# Patient Record
Sex: FEMALE | Race: WHITE | NOT HISPANIC OR LATINO | Employment: OTHER | URBAN - METROPOLITAN AREA
[De-identification: names, ages, dates, MRNs, and addresses within clinical notes are randomized per-mention and may not be internally consistent; named-entity substitution may affect disease eponyms.]

---

## 2017-10-08 ENCOUNTER — HOSPITAL ENCOUNTER (INPATIENT)
Facility: OTHER | Age: 62
LOS: 2 days | Discharge: HOME | DRG: 159 | End: 2017-10-11
Attending: EMERGENCY MEDICINE | Admitting: PLASTIC SURGERY
Payer: MEDICARE

## 2017-10-08 DIAGNOSIS — S02.609A: Primary | ICD-10-CM

## 2017-10-08 DIAGNOSIS — S02.609A BILATERAL CLOSED FRACTURE OF MANDIBLE, INITIAL ENCOUNTER: ICD-10-CM

## 2017-10-08 PROCEDURE — 96374 THER/PROPH/DIAG INJ IV PUSH: CPT

## 2017-10-08 PROCEDURE — 99284 EMERGENCY DEPT VISIT MOD MDM: CPT | Mod: 25

## 2017-10-08 PROCEDURE — 96361 HYDRATE IV INFUSION ADD-ON: CPT

## 2017-10-08 PROCEDURE — 0NSV34Z REPOSITION LEFT MANDIBLE WITH INTERNAL FIXATION DEVICE, PERCUTANEOUS APPROACH: ICD-10-PCS | Performed by: PLASTIC SURGERY

## 2017-10-08 PROCEDURE — 0NST34Z REPOSITION RIGHT MANDIBLE WITH INTERNAL FIXATION DEVICE, PERCUTANEOUS APPROACH: ICD-10-PCS | Performed by: PLASTIC SURGERY

## 2017-10-09 ENCOUNTER — ANESTHESIA EVENT (OUTPATIENT)
Dept: SURGERY | Facility: OTHER | Age: 62
DRG: 159 | End: 2017-10-09
Payer: MEDICARE

## 2017-10-09 ENCOUNTER — ANESTHESIA (OUTPATIENT)
Dept: SURGERY | Facility: OTHER | Age: 62
DRG: 159 | End: 2017-10-09
Payer: MEDICARE

## 2017-10-09 PROBLEM — S02.609A: Status: ACTIVE | Noted: 2017-10-09

## 2017-10-09 PROCEDURE — 25000003 PHARM REV CODE 250: Performed by: PLASTIC SURGERY

## 2017-10-09 PROCEDURE — C1713 ANCHOR/SCREW BN/BN,TIS/BN: HCPCS | Performed by: PLASTIC SURGERY

## 2017-10-09 PROCEDURE — 63600175 PHARM REV CODE 636 W HCPCS: Performed by: NURSE ANESTHETIST, CERTIFIED REGISTERED

## 2017-10-09 PROCEDURE — 63600175 PHARM REV CODE 636 W HCPCS: Performed by: PLASTIC SURGERY

## 2017-10-09 PROCEDURE — 37000009 HC ANESTHESIA EA ADD 15 MINS: Performed by: PLASTIC SURGERY

## 2017-10-09 PROCEDURE — 63600175 PHARM REV CODE 636 W HCPCS: Performed by: SURGERY

## 2017-10-09 PROCEDURE — 25000003 PHARM REV CODE 250: Performed by: SURGERY

## 2017-10-09 PROCEDURE — 37000008 HC ANESTHESIA 1ST 15 MINUTES: Performed by: PLASTIC SURGERY

## 2017-10-09 PROCEDURE — 99900035 HC TECH TIME PER 15 MIN (STAT)

## 2017-10-09 PROCEDURE — 27201423 OPTIME MED/SURG SUP & DEVICES STERILE SUPPLY: Performed by: PLASTIC SURGERY

## 2017-10-09 PROCEDURE — 25000003 PHARM REV CODE 250: Performed by: NURSE ANESTHETIST, CERTIFIED REGISTERED

## 2017-10-09 PROCEDURE — 11000001 HC ACUTE MED/SURG PRIVATE ROOM

## 2017-10-09 PROCEDURE — C1769 GUIDE WIRE: HCPCS | Performed by: PLASTIC SURGERY

## 2017-10-09 PROCEDURE — 36000704 HC OR TIME LEV I 1ST 15 MIN: Performed by: PLASTIC SURGERY

## 2017-10-09 PROCEDURE — A4216 STERILE WATER/SALINE, 10 ML: HCPCS | Performed by: SURGERY

## 2017-10-09 PROCEDURE — 36000705 HC OR TIME LEV I EA ADD 15 MIN: Performed by: PLASTIC SURGERY

## 2017-10-09 PROCEDURE — 71000033 HC RECOVERY, INTIAL HOUR: Performed by: PLASTIC SURGERY

## 2017-10-09 DEVICE — PLATE BONE MAX 9 HOLE SMALL 2M: Type: IMPLANTABLE DEVICE | Site: MOUTH | Status: FUNCTIONAL

## 2017-10-09 DEVICE — WIRE GUIDE: Type: IMPLANTABLE DEVICE | Site: MOUTH | Status: FUNCTIONAL

## 2017-10-09 DEVICE — IMPLANTABLE DEVICE: Type: IMPLANTABLE DEVICE | Site: MOUTH | Status: FUNCTIONAL

## 2017-10-09 RX ORDER — CEFAZOLIN SODIUM 2 G/50ML
2 SOLUTION INTRAVENOUS
Status: COMPLETED | OUTPATIENT
Start: 2017-10-09 | End: 2017-10-10

## 2017-10-09 RX ORDER — LIDOCAINE HYDROCHLORIDE AND EPINEPHRINE 10; 10 MG/ML; UG/ML
INJECTION, SOLUTION INFILTRATION; PERINEURAL
Status: DISCONTINUED | OUTPATIENT
Start: 2017-10-09 | End: 2017-10-09 | Stop reason: HOSPADM

## 2017-10-09 RX ORDER — FENTANYL CITRATE 50 UG/ML
INJECTION, SOLUTION INTRAMUSCULAR; INTRAVENOUS
Status: DISCONTINUED | OUTPATIENT
Start: 2017-10-09 | End: 2017-10-09

## 2017-10-09 RX ORDER — MIDAZOLAM HYDROCHLORIDE 1 MG/ML
INJECTION INTRAMUSCULAR; INTRAVENOUS
Status: DISCONTINUED | OUTPATIENT
Start: 2017-10-09 | End: 2017-10-09

## 2017-10-09 RX ORDER — SODIUM CHLORIDE 0.9 % (FLUSH) 0.9 %
3 SYRINGE (ML) INJECTION EVERY 8 HOURS
Status: DISCONTINUED | OUTPATIENT
Start: 2017-10-09 | End: 2017-10-11 | Stop reason: HOSPADM

## 2017-10-09 RX ORDER — ACETAMINOPHEN 10 MG/ML
INJECTION, SOLUTION INTRAVENOUS
Status: DISCONTINUED | OUTPATIENT
Start: 2017-10-09 | End: 2017-10-09

## 2017-10-09 RX ORDER — SODIUM CHLORIDE 0.9 % (FLUSH) 0.9 %
3 SYRINGE (ML) INJECTION
Status: DISCONTINUED | OUTPATIENT
Start: 2017-10-09 | End: 2017-10-11 | Stop reason: HOSPADM

## 2017-10-09 RX ORDER — OXYCODONE HYDROCHLORIDE 5 MG/1
5 TABLET ORAL
Status: DISCONTINUED | OUTPATIENT
Start: 2017-10-09 | End: 2017-10-09

## 2017-10-09 RX ORDER — METOCLOPRAMIDE HYDROCHLORIDE 5 MG/ML
5 INJECTION INTRAMUSCULAR; INTRAVENOUS EVERY 6 HOURS PRN
Status: DISCONTINUED | OUTPATIENT
Start: 2017-10-09 | End: 2017-10-11 | Stop reason: HOSPADM

## 2017-10-09 RX ORDER — DEXAMETHASONE SODIUM PHOSPHATE 4 MG/ML
8 INJECTION, SOLUTION INTRA-ARTICULAR; INTRALESIONAL; INTRAMUSCULAR; INTRAVENOUS; SOFT TISSUE EVERY 12 HOURS
Status: COMPLETED | OUTPATIENT
Start: 2017-10-09 | End: 2017-10-10

## 2017-10-09 RX ORDER — ACETAMINOPHEN 325 MG/1
650 TABLET ORAL EVERY 6 HOURS PRN
Status: DISCONTINUED | OUTPATIENT
Start: 2017-10-09 | End: 2017-10-11 | Stop reason: HOSPADM

## 2017-10-09 RX ORDER — CHLORHEXIDINE GLUCONATE ORAL RINSE 1.2 MG/ML
15 SOLUTION DENTAL 2 TIMES DAILY
Status: DISCONTINUED | OUTPATIENT
Start: 2017-10-09 | End: 2017-10-09

## 2017-10-09 RX ORDER — PROPOFOL 10 MG/ML
VIAL (ML) INTRAVENOUS
Status: DISCONTINUED | OUTPATIENT
Start: 2017-10-09 | End: 2017-10-09

## 2017-10-09 RX ORDER — ONDANSETRON 2 MG/ML
4 INJECTION INTRAMUSCULAR; INTRAVENOUS DAILY PRN
Status: DISCONTINUED | OUTPATIENT
Start: 2017-10-09 | End: 2017-10-09

## 2017-10-09 RX ORDER — MORPHINE SULFATE 10 MG/ML
2 INJECTION INTRAMUSCULAR; INTRAVENOUS; SUBCUTANEOUS EVERY 4 HOURS PRN
Status: DISCONTINUED | OUTPATIENT
Start: 2017-10-09 | End: 2017-10-11 | Stop reason: HOSPADM

## 2017-10-09 RX ORDER — HYDROCODONE BITARTRATE AND ACETAMINOPHEN 7.5; 325 MG/15ML; MG/15ML
15 SOLUTION ORAL EVERY 4 HOURS PRN
Status: DISCONTINUED | OUTPATIENT
Start: 2017-10-09 | End: 2017-10-09

## 2017-10-09 RX ORDER — GLYCOPYRROLATE 0.2 MG/ML
INJECTION INTRAMUSCULAR; INTRAVENOUS
Status: DISCONTINUED | OUTPATIENT
Start: 2017-10-09 | End: 2017-10-09

## 2017-10-09 RX ORDER — ONDANSETRON HYDROCHLORIDE 2 MG/ML
INJECTION, SOLUTION INTRAMUSCULAR; INTRAVENOUS
Status: DISCONTINUED | OUTPATIENT
Start: 2017-10-09 | End: 2017-10-09

## 2017-10-09 RX ORDER — FLUMAZENIL 0.1 MG/ML
INJECTION INTRAVENOUS
Status: DISCONTINUED | OUTPATIENT
Start: 2017-10-09 | End: 2017-10-09

## 2017-10-09 RX ORDER — ROCURONIUM BROMIDE 10 MG/ML
INJECTION, SOLUTION INTRAVENOUS
Status: DISCONTINUED | OUTPATIENT
Start: 2017-10-09 | End: 2017-10-09

## 2017-10-09 RX ORDER — ONDANSETRON 2 MG/ML
4 INJECTION INTRAMUSCULAR; INTRAVENOUS EVERY 12 HOURS PRN
Status: DISCONTINUED | OUTPATIENT
Start: 2017-10-09 | End: 2017-10-11 | Stop reason: HOSPADM

## 2017-10-09 RX ORDER — SODIUM CHLORIDE, SODIUM LACTATE, POTASSIUM CHLORIDE, CALCIUM CHLORIDE 600; 310; 30; 20 MG/100ML; MG/100ML; MG/100ML; MG/100ML
INJECTION, SOLUTION INTRAVENOUS CONTINUOUS
Status: DISCONTINUED | OUTPATIENT
Start: 2017-10-09 | End: 2017-10-09

## 2017-10-09 RX ORDER — MEPERIDINE HYDROCHLORIDE 50 MG/ML
12.5 INJECTION INTRAMUSCULAR; INTRAVENOUS; SUBCUTANEOUS ONCE AS NEEDED
Status: DISCONTINUED | OUTPATIENT
Start: 2017-10-09 | End: 2017-10-09

## 2017-10-09 RX ORDER — NEOSTIGMINE METHYLSULFATE 1 MG/ML
INJECTION, SOLUTION INTRAVENOUS
Status: DISCONTINUED | OUTPATIENT
Start: 2017-10-09 | End: 2017-10-09

## 2017-10-09 RX ORDER — OXYCODONE HYDROCHLORIDE 5 MG/1
5 TABLET ORAL EVERY 4 HOURS PRN
Status: DISCONTINUED | OUTPATIENT
Start: 2017-10-09 | End: 2017-10-09

## 2017-10-09 RX ORDER — SODIUM CHLORIDE, SODIUM LACTATE, POTASSIUM CHLORIDE, CALCIUM CHLORIDE 600; 310; 30; 20 MG/100ML; MG/100ML; MG/100ML; MG/100ML
INJECTION, SOLUTION INTRAVENOUS CONTINUOUS PRN
Status: DISCONTINUED | OUTPATIENT
Start: 2017-10-09 | End: 2017-10-09

## 2017-10-09 RX ORDER — DEXAMETHASONE SODIUM PHOSPHATE 4 MG/ML
INJECTION, SOLUTION INTRA-ARTICULAR; INTRALESIONAL; INTRAMUSCULAR; INTRAVENOUS; SOFT TISSUE
Status: DISCONTINUED | OUTPATIENT
Start: 2017-10-09 | End: 2017-10-09

## 2017-10-09 RX ORDER — HYDROMORPHONE HYDROCHLORIDE 2 MG/ML
0.4 INJECTION, SOLUTION INTRAMUSCULAR; INTRAVENOUS; SUBCUTANEOUS EVERY 5 MIN PRN
Status: DISCONTINUED | OUTPATIENT
Start: 2017-10-09 | End: 2017-10-09

## 2017-10-09 RX ORDER — ACETAMINOPHEN AND CODEINE PHOSPHATE 120; 12 MG/5ML; MG/5ML
5 SOLUTION ORAL EVERY 4 HOURS PRN
Status: DISCONTINUED | OUTPATIENT
Start: 2017-10-09 | End: 2017-10-11 | Stop reason: HOSPADM

## 2017-10-09 RX ORDER — DIPHENHYDRAMINE HYDROCHLORIDE 50 MG/ML
12.5 INJECTION INTRAMUSCULAR; INTRAVENOUS EVERY 30 MIN PRN
Status: DISCONTINUED | OUTPATIENT
Start: 2017-10-09 | End: 2017-10-09

## 2017-10-09 RX ORDER — ONDANSETRON 2 MG/ML
4 INJECTION INTRAMUSCULAR; INTRAVENOUS EVERY 12 HOURS PRN
Status: DISCONTINUED | OUTPATIENT
Start: 2017-10-09 | End: 2017-10-09

## 2017-10-09 RX ORDER — CHLORHEXIDINE GLUCONATE ORAL RINSE 1.2 MG/ML
15 SOLUTION DENTAL 2 TIMES DAILY
Status: DISCONTINUED | OUTPATIENT
Start: 2017-10-09 | End: 2017-10-11 | Stop reason: HOSPADM

## 2017-10-09 RX ORDER — LIDOCAINE HCL/PF 100 MG/5ML
SYRINGE (ML) INTRAVENOUS
Status: DISCONTINUED | OUTPATIENT
Start: 2017-10-09 | End: 2017-10-09

## 2017-10-09 RX ORDER — SODIUM CHLORIDE, SODIUM LACTATE, POTASSIUM CHLORIDE, CALCIUM CHLORIDE 600; 310; 30; 20 MG/100ML; MG/100ML; MG/100ML; MG/100ML
INJECTION, SOLUTION INTRAVENOUS CONTINUOUS
Status: DISCONTINUED | OUTPATIENT
Start: 2017-10-09 | End: 2017-10-11 | Stop reason: HOSPADM

## 2017-10-09 RX ORDER — HEPARIN SODIUM 5000 [USP'U]/ML
5000 INJECTION, SOLUTION INTRAVENOUS; SUBCUTANEOUS EVERY 8 HOURS
Status: DISCONTINUED | OUTPATIENT
Start: 2017-10-09 | End: 2017-10-11 | Stop reason: HOSPADM

## 2017-10-09 RX ORDER — MORPHINE SULFATE 2 MG/ML
2 INJECTION, SOLUTION INTRAMUSCULAR; INTRAVENOUS EVERY 4 HOURS PRN
Status: DISCONTINUED | OUTPATIENT
Start: 2017-10-09 | End: 2017-10-09

## 2017-10-09 RX ORDER — FENTANYL CITRATE 50 UG/ML
25 INJECTION, SOLUTION INTRAMUSCULAR; INTRAVENOUS EVERY 5 MIN PRN
Status: DISCONTINUED | OUTPATIENT
Start: 2017-10-09 | End: 2017-10-09

## 2017-10-09 RX ADMIN — DEXAMETHASONE SODIUM PHOSPHATE 8 MG: 4 INJECTION, SOLUTION INTRAMUSCULAR; INTRAVENOUS at 04:10

## 2017-10-09 RX ADMIN — SODIUM CHLORIDE, SODIUM LACTATE, POTASSIUM CHLORIDE, AND CALCIUM CHLORIDE: 600; 310; 30; 20 INJECTION, SOLUTION INTRAVENOUS at 01:10

## 2017-10-09 RX ADMIN — SODIUM CHLORIDE, SODIUM LACTATE, POTASSIUM CHLORIDE, AND CALCIUM CHLORIDE: 600; 310; 30; 20 INJECTION, SOLUTION INTRAVENOUS at 07:10

## 2017-10-09 RX ADMIN — CHLORHEXIDINE GLUCONATE 15 ML: 1.2 RINSE ORAL at 09:10

## 2017-10-09 RX ADMIN — ROCURONIUM BROMIDE 30 MG: 10 INJECTION, SOLUTION INTRAVENOUS at 03:10

## 2017-10-09 RX ADMIN — AMPICILLIN AND SULBACTAM 1.5 G: 1; .5 INJECTION, POWDER, FOR SOLUTION INTRAVENOUS at 08:10

## 2017-10-09 RX ADMIN — SODIUM CHLORIDE, SODIUM LACTATE, POTASSIUM CHLORIDE, AND CALCIUM CHLORIDE: 600; 310; 30; 20 INJECTION, SOLUTION INTRAVENOUS at 03:10

## 2017-10-09 RX ADMIN — CARBOXYMETHYLCELLULOSE SODIUM 2 DROP: 2.5 SOLUTION/ DROPS OPHTHALMIC at 03:10

## 2017-10-09 RX ADMIN — LIDOCAINE HYDROCHLORIDE 75 MG: 20 INJECTION, SOLUTION INTRAVENOUS at 03:10

## 2017-10-09 RX ADMIN — DEXAMETHASONE SODIUM PHOSPHATE 8 MG: 4 INJECTION, SOLUTION INTRAMUSCULAR; INTRAVENOUS at 09:10

## 2017-10-09 RX ADMIN — NEOSTIGMINE METHYLSULFATE 5 MG: 1 INJECTION INTRAVENOUS at 04:10

## 2017-10-09 RX ADMIN — FENTANYL CITRATE 50 MCG: 50 INJECTION, SOLUTION INTRAMUSCULAR; INTRAVENOUS at 04:10

## 2017-10-09 RX ADMIN — ONDANSETRON 4 MG: 2 INJECTION, SOLUTION INTRAMUSCULAR; INTRAVENOUS at 04:10

## 2017-10-09 RX ADMIN — SODIUM CHLORIDE, SODIUM LACTATE, POTASSIUM CHLORIDE, AND CALCIUM CHLORIDE: 600; 310; 30; 20 INJECTION, SOLUTION INTRAVENOUS at 08:10

## 2017-10-09 RX ADMIN — AMPICILLIN AND SULBACTAM 1.5 G: 1; .5 INJECTION, POWDER, FOR SOLUTION INTRAVENOUS at 01:10

## 2017-10-09 RX ADMIN — MIDAZOLAM HYDROCHLORIDE 1 MG: 1 INJECTION, SOLUTION INTRAMUSCULAR; INTRAVENOUS at 03:10

## 2017-10-09 RX ADMIN — HEPARIN SODIUM 5000 UNITS: 5000 INJECTION, SOLUTION INTRAVENOUS; SUBCUTANEOUS at 09:10

## 2017-10-09 RX ADMIN — GLYCOPYRROLATE 0.8 MG: 0.2 INJECTION, SOLUTION INTRAMUSCULAR; INTRAVENOUS at 04:10

## 2017-10-09 RX ADMIN — FENTANYL CITRATE 50 MCG: 50 INJECTION, SOLUTION INTRAMUSCULAR; INTRAVENOUS at 03:10

## 2017-10-09 RX ADMIN — ACETAMINOPHEN 1000 MG: 10 INJECTION, SOLUTION INTRAVENOUS at 04:10

## 2017-10-09 RX ADMIN — AMPICILLIN AND SULBACTAM 1.5 G: 1; .5 INJECTION, POWDER, FOR SOLUTION INTRAVENOUS at 02:10

## 2017-10-09 RX ADMIN — FLUMAZENIL 0.25 MG: 0.1 INJECTION, SOLUTION INTRAVENOUS at 04:10

## 2017-10-09 RX ADMIN — SODIUM CHLORIDE, PRESERVATIVE FREE 3 ML: 5 INJECTION INTRAVENOUS at 03:10

## 2017-10-09 RX ADMIN — CEFAZOLIN SODIUM 2 G: 2 SOLUTION INTRAVENOUS at 05:10

## 2017-10-09 RX ADMIN — SODIUM CHLORIDE, PRESERVATIVE FREE 3 ML: 5 INJECTION INTRAVENOUS at 09:10

## 2017-10-09 RX ADMIN — PROPOFOL 100 MG: 10 INJECTION, EMULSION INTRAVENOUS at 03:10

## 2017-10-09 NOTE — ANESTHESIA POSTPROCEDURE EVALUATION
"Anesthesia Post Evaluation    Patient: Mariah Hamilton    Procedure(s) Performed: Procedure(s) (LRB):  REDUCTION-CLOSED-MANDIBULAR FRACTURE (Bilateral)    Final Anesthesia Type: general  Patient location during evaluation: PACU  Patient participation: Yes- Able to Participate  Level of consciousness: awake and alert  Post-procedure vital signs: reviewed and stable  Pain management: adequate  Airway patency: patent  PONV status at discharge: No PONV  Anesthetic complications: no      Cardiovascular status: blood pressure returned to baseline and stable  Respiratory status: unassisted, spontaneous ventilation and room air  Hydration status: euvolemic  Follow-up not needed.        Visit Vitals  /71 (BP Location: Left arm, Patient Position: Lying)   Pulse 91   Temp 36.5 °C (97.7 °F) (Oral)   Resp 18   Ht 5' 3" (1.6 m)   Wt 65.1 kg (143 lb 8 oz)   SpO2 98%   Breastfeeding? No   BMI 25.42 kg/m²       Pain/Bijan Score: Pain Assessment Performed: Yes (10/9/2017  5:00 PM)  Presence of Pain: denies (10/9/2017  5:00 PM)  Bijan Score: 8 (10/9/2017  5:00 PM)  Modified Bijan Score: 18 (10/9/2017  5:00 PM)      "

## 2017-10-09 NOTE — PLAN OF CARE
Discharge Planning:    Patient will be discharged to her daughter Sahara's home:  [address] [place] [id number]      Amanda Bello RN,CM  Phone # [phone]  Fax # [phone]

## 2017-10-09 NOTE — TRANSFER OF CARE
"Anesthesia Transfer of Care Note    Patient: Mariah Hamilton    Procedure(s) Performed: Procedure(s) (LRB):  REDUCTION-CLOSED-MANDIBULAR FRACTURE (Bilateral)    Patient location: PACU    Anesthesia Type: general    Transport from OR: Transported from OR on room air with adequate spontaneous ventilation    Post pain: adequate analgesia    Post assessment: no apparent anesthetic complications and tolerated procedure well    Post vital signs: stable    Level of consciousness: awake, alert and oriented    Nausea/Vomiting: no nausea/vomiting    Complications: none    Transfer of care protocol was followed      Last vitals:   Visit Vitals  /63 (Patient Position: Lying)   Pulse 83   Temp 37.4 °C (99.4 °F) (Oral)   Resp 18   Ht 5' 3" (1.6 m)   Wt 65.1 kg (143 lb 8 oz)   SpO2 (!) 93%   Breastfeeding? No   BMI 25.42 kg/m²     "

## 2017-10-09 NOTE — ANESTHESIA PREPROCEDURE EVALUATION
10/09/2017  Mariah Hamilton is a 62 y.o., female.    Anesthesia Evaluation    I have reviewed the Patient Summary Reports.    I have reviewed the Nursing Notes.   I have reviewed the Medications.     Review of Systems  Anesthesia Hx:  Denies Family Hx of Anesthesia complications.   Denies Personal Hx of Anesthesia complications.   Social:  Former Smoker    Hematology/Oncology:  Hematology Normal       -- Cancer in past history (skin):    EENT/Dental:EENT/Dental Normal   Cardiovascular:  Cardiovascular Normal  Syncope at home, fell and broke mandible  W/u neg head CT, no cardiac hx or sx's.    Pulmonary:  Pulmonary Normal    Renal/:   Chronic Renal Disease, CRI    Hepatic/GI:  Hepatic/GI Normal    Musculoskeletal:  Musculoskeletal Normal    Neurological:  Neurology Normal    Endocrine:  Endocrine Normal    Dermatological:  Skin Normal    Psych:  Psychiatric Normal           Physical Exam  General:  Well nourished    Airway/Jaw/Neck:  Airway Findings: Mouth Opening: < 3 cm General Airway Assessment: Possible difficult intubation, Possible difficult mask airway  Mallampati: IV  TM Distance: < 4 cm  Jaw/Neck Findings:  Neck ROM: Normal ROM      Dental:  Dental Findings: In tact         Mental Status:  Mental Status Findings:  Cooperative, Alert and Oriented         Anesthesia Plan  Type of Anesthesia, risks & benefits discussed:  Anesthesia Type:  general  Patient's Preference:   Intra-op Monitoring Plan: standard ASA monitors  Intra-op Monitoring Plan Comments:   Post Op Pain Control Plan:   Post Op Pain Control Plan Comments:   Induction:    Beta Blocker:         Informed Consent: Patient understands risks and agrees with Anesthesia plan.  Questions answered. Anesthesia consent signed with patient.  ASA Score: 3     Day of Surgery Review of History & Physical:    H&P update referred to the surgeon.          Ready For Surgery From Anesthesia Perspective.

## 2017-10-09 NOTE — PLAN OF CARE
Problem: Patient Care Overview  Goal: Plan of Care Review  Outcome: Ongoing (interventions implemented as appropriate)  Reviewed plan of care with pt at bedside. IVFs and abx admin per md order. Pt denies pain on pm shift. VSS. Pt remains NPO for surgery today. Pre-op checklist initiated; sx consent signed in pt folder. Hourly rounding performed. All safety precautions in place, bed alarm on, side rails up x2, call light within reach/ Instructed pt to call for assistance. Pt remains free from falls/injury. Will continue to monitor.

## 2017-10-09 NOTE — ED PROVIDER NOTES
Encounter Date: 10/8/2017       History     Chief Complaint   Patient presents with    Transfer     transfer from [facility] (accepted by [facility]) for further evaluation of OMFS; report pt had syncopal episode earlier today, falling forward striking her face     Patient is a 62 y.o. female presenting to the emergency department as a transfer for evaluation of a mandibular fracture.  The patient is accompanied by her daughter.  The patient's daughter states that earlier this afternoon at approximately 3 PM she was walking in her kitchen when she had a syncopal episode, causing her to fall forward on her face landing on a the carpet.  She admits that she was taken to [facility] where they told her she broke her jaw, needed further evaluation.  The patient currently denies pain at this time.       The history is provided by the patient and a relative.     Review of patient's allergies indicates:  Allergies not on file  Past Medical History:   Diagnosis Date    Basal cell carcinoma     to face      Past Surgical History:   Procedure Laterality Date    BASAL CELL CARCINOMA EXCISION Right     cervicofacial flap Right      SECTION       History reviewed. No pertinent family history.  Social History   Substance Use Topics    Smoking status: Never Smoker    Smokeless tobacco: Never Used    Alcohol use No     Review of Systems   Constitutional: Negative for activity change, appetite change, chills, fatigue and fever.   HENT: Positive for facial swelling. Negative for congestion, rhinorrhea and sore throat.    Eyes: Negative for photophobia and visual disturbance.   Respiratory: Negative for cough, shortness of breath and wheezing.    Cardiovascular: Negative for chest pain.   Gastrointestinal: Negative for abdominal pain, diarrhea, nausea and vomiting.   Genitourinary: Negative for dysuria, hematuria and urgency.   Musculoskeletal: Negative for back pain, myalgias and neck pain.    Skin: Negative for color change and wound.   Neurological: Positive for syncope. Negative for weakness and headaches.   Psychiatric/Behavioral: Negative for agitation and confusion.       Physical Exam     Initial Vitals [10/08/17 2233]   BP Pulse Resp Temp SpO2   (!) 146/67 78 18 98.1 °F (36.7 °C) 96 %      MAP       93.33         Physical Exam    Nursing note and vitals reviewed.  Constitutional: She appears well-developed and well-nourished. She is not diaphoretic. She is cooperative.  Non-toxic appearance. She does not have a sickly appearance. She does not appear ill. No distress.    female presenting to the emergency department via EMS transfer accompanied by her daughter.  She speaking clear and full sentences.  She denies acute distress.   HENT:   Head: Normocephalic and atraumatic.   Right Ear: External ear normal.   Left Ear: External ear normal.   Nose: Nose normal.   Mouth/Throat: Oropharynx is clear and moist.   Tenderness to palpation of the mandible bilaterally , decreased range of motion.  1 horizontal laceration noted to the chin, 6 sutures in place. 1 smaller laceration noted to the lower submandibular region with 3 sutures in place. Tenderness of multiple teeth, especially at tooth #24 with subluxation noted.   Eyes: Conjunctivae and EOM are normal.   Neck: Normal range of motion. Neck supple.   Cardiovascular: Normal rate, regular rhythm and normal heart sounds.   Pulmonary/Chest: Breath sounds normal. No respiratory distress. She has no wheezes.   Musculoskeletal: Normal range of motion.   Neurological: She is alert and oriented to person, place, and time. GCS eye subscore is 4. GCS verbal subscore is 5. GCS motor subscore is 6.   Skin: Skin is warm.   Psychiatric: She has a normal mood and affect. Her behavior is normal. Judgment and thought content normal.         ED Course   Procedures  Labs Reviewed - No data to display          Medical Decision Making:   History:   Old Medical  Records: I decided to obtain old medical records.  Old Records Summarized: records from another hospital.       <> Summary of Records: Reviewed medical records from [facility].  CBC, CMP, magnesium, PT, PTT are all unremarkable.    CT head without contrast shows no acute intracranial hemorrhage; moderate diffuse cerebral and cerebellar atrophy when accounting for age with mild.  Ventricular deep cerebral white matter findings suggestive of chronic microscopic ischemic disease.  CT cervical spine shows no acute fracture or malalignment.    CT facial bones show extensively comminuted fracture involving the neck and body of both mandibular condyles.  There are multiple segmented fracture fragment, left greater than the right and foreshortening of the jaw.  Subcutaneous soft tissue emphysema and swelling under the mandible noted.  Chest x-ray shows no acute cardiac or pulmonary process.  EKG shows normal sinus rhythm with a rate of 89 bpm, no STEMI.  While in the emergency department, the patient received T., 900 mg of clindamycin, Zofran, 2 mg of morphine, fluids, also had laceration repair.  Initial Assessment:   Urgent evaluation of a 62 y.o. female presenting to the emergency department as a transfer for a mandibular fracture. Patient is afebrile, nontoxic appearing and hemodynamically stable. Physical exam reveals regular rate and rhythm, lungs are clear to auscultation bilaterally. Tenderness to palpation of the mandible, repaired lacerations noted. Dental subluxations present. reviewed medical record as noted above. Will plan to page RUPERTO.   ED Management:  10:33 PM Called and left Dr. Thapa ([facility]) message that the patient has arrived in ED.  11:32 PM Called and left second message for Dr. Thapa.   11:44 PM Paged [facility] Plastic surgery resident.   11:46 PM Dr. Gambino will come see and evaluate the patient.   Dr. Gambino has seen and evaluated the patient. Will admit the patient for  surgery in the am. The treatment plan was discussed with the patient who demonstrated understanding and comfort with plan. The patient's history, physical exam, and plan of care was discussed with and agreed upon with my supervising physician.    Other:   I have discussed this case with another health care provider.       <> Summary of the Discussion: Dr. Lorenzo, Dr. Gambino   This note was created using Dragon Medical Dictation. There may be typographical errors secondary to dictation.               Attending Attestation:     Physician Attestation Statement for NP/PA:   I have conducted a face to face encounter with this patient in addition to the NP/PA, due to Medical Complexity    Other NP/PA Attestation Additions:      Medical Decision Making:     Transferred for OMFS repair of B/L mandible fx after syncope and trauma. Syncope workup there (-), needs OR repair of fractures. Admitted to FS                 ED Course      Clinical Impression:     1. Bilateral closed fracture of mandible    2. Bilateral closed fracture of mandible, initial encounter       Disposition:   Disposition: Admitted  Condition: Stable                        Bettie Bain PA-C  10/09/17 0121       Hank Lorenzo MD  10/09/17 0802

## 2017-10-09 NOTE — PLAN OF CARE
10/09/17 1317   Discharge Assessment   Assessment Type Discharge Planning Assessment   Confirmed/corrected address and phone number on facesheet? Yes   Assessment information obtained from? Patient;Caregiver;Medical Record   Expected Length of Stay (days) 3   Communicated expected length of stay with patient/caregiver yes   Prior to hospitilization cognitive status: Alert/Oriented   Prior to hospitalization functional status: Independent   Current cognitive status: Alert/Oriented   Current Functional Status: Independent   Facility Arrived From: [facility]   Lives With alone   Able to Return to Prior Arrangements no   Is patient able to care for self after discharge? Unable to determine at this time (comments)   Who are your caregiver(s) and their phone number(s)? Daughter will bring patient to her  house. Sahara Louie  # [phone]   Patient's perception of discharge disposition admitted as an inpatient   Readmission Within The Last 30 Days no previous admission in last 30 days   Patient currently being followed by outpatient case management? No   Patient currently receives any other outside agency services? No   Equipment Currently Used at Home none   Do you have any problems affording any of your prescribed medications? No   Is the patient taking medications as prescribed? yes   Does the patient have transportation home? Yes   Transportation Available car;family or friend will provide   Does the patient receive services at the [facility]? No   Discharge Plan A Home with family   Patient/Family In Agreement With Plan yes

## 2017-10-09 NOTE — ED NOTES
Patient rec'ed Tetanus shot, Clindamycin 900 mg IVPB, Morphine 2mg, and Zofran 4 mg IVP prior to transfer.

## 2017-10-09 NOTE — ED NOTES
"Pt transferred from [facility], accepted by Dr. Jay. Pt reporting she had syncopal episode earlier today while at home with postive LOC. Pt stating, "I ran to the couch trying to catch myself because I began to fell very dizzy, I had an episode like this earlier this year" but pt did not have LOC during episode. Pt with 6 stitches in place to chin below mouth and 3 stiches in place lower chin/neck region. Pt reporting pain is 8/10, reporting sore throat. Provider is aware of pt current pain level. Pt refusing pain medicine at this time. Will continue to monitor. Pt AAOx4 and appropriate at this time. Respirations even and unlabored. No acute distress noted.   "

## 2017-10-09 NOTE — H&P
[facility]  History & Physical  Plastic Surgery    SUBJECTIVE:     Chief Complaint/Reason for Admission: mandible fracture    History of Present Illness:  Patient is a 62 y.o. female transferred from [facility] with bilateral mandible fracture.  Patient was in her usual state of health when she sustained a syncopal episode and fell forward on her face.  Syncopal workup was negative but CT Max/Face demonstrated bilateral subcondylar fractures.  Patient reports a sore throat and believes she was coming down with a cold.  She reports minimal left jaw pain.  Denies fevers/chills, CP/SOB, nausea/vomiting.    Review of patient's allergies indicates:  No Known Allergies    Past Medical History:   Diagnosis Date    Basal cell carcinoma     to face      Past Surgical History:   Procedure Laterality Date    BASAL CELL CARCINOMA EXCISION Right     cervicofacial flap Right      SECTION       History reviewed. No pertinent family history.  Social History   Substance Use Topics    Smoking status: Never Smoker    Smokeless tobacco: Never Used    Alcohol use No        Review of Systems:  Constitutional: no fever or chills, pain well controlled  Eyes: no visual changes  ENT: positive for sore throat  Respiratory: no cough or shortness of breath  Cardiovascular: no chest pain or palpitations  Gastrointestinal: no nausea or vomiting, tolerating diet  Neurological: no seizures or tremors  Behavioral/Psych: no auditory or visual hallucinations    OBJECTIVE:     Vital Signs (Most Recent):  Temp: 98.1 °F (36.7 °C) (10/08/17 2233)  Pulse: 80 (10/09/17 0023)  Resp: 18 (10/08/17 2233)  BP: 124/60 (10/09/17 0023)  SpO2: (!) 94 % (10/09/17 0023)    Physical Exam:  General: well developed, well nourished, appears stated age, no distress  Head: normocephalic, PERRL, EOMI. +TTP over left TMJ.  no intra-oral lacerations.  +repaired lacerations to chin and neck.  CN2-12 intact  Eyes:  conjunctivae/corneas clear.,  negative findings: lids and lashes normal and pupils equal, round, reactive to light and accomodation  Nose: Nares normal. Septum midline. Mucosa normal. No drainage or sinus tenderness., no discharge, no epistaxis  Neck: supple, symmetrical, trachea midline  Lungs:  clear to auscultation bilaterally and normal respiratory effort  Heart: regular rate and rhythm, S1, S2 normal, no murmur, rub or gallop  Abdomen: soft, non-tender non-distented; bowel sounds normal; no masses,  no organomegaly    Diagnostic Results:  CT: Reviewed  bilateral comminuted subcondylar fractures    ASSESSMENT/PLAN:     63yo F with bilateral subcondylar fractures  Admit to Plastic Surgery, NPO, IV antibiotics (unasyn)  -decadron to decrease swelling  -peridex  -to OR later today for closed reduction, mmf, possible open reduction

## 2017-10-09 NOTE — ED NOTES
Pt AAOx4 with VSS and respirations even and unlabored at this time. Fall risk band applied. Pt c/o sore throat while sitting up on stretcher,but denies wanting pain medication at this time. Restroom and comfort needs addressed. Pt advised that she is NPO and given mouth swabs for comfort. Pt denies any other needs at this time. Side rails raised x2 and call light within reach. Daughter present at bedside with patient at this time. Will continue to monitor for any other changes.

## 2017-10-10 PROCEDURE — 97162 PT EVAL MOD COMPLEX 30 MIN: CPT

## 2017-10-10 PROCEDURE — 63600175 PHARM REV CODE 636 W HCPCS: Performed by: PLASTIC SURGERY

## 2017-10-10 PROCEDURE — 97116 GAIT TRAINING THERAPY: CPT

## 2017-10-10 PROCEDURE — A4216 STERILE WATER/SALINE, 10 ML: HCPCS | Performed by: SURGERY

## 2017-10-10 PROCEDURE — 99222 1ST HOSP IP/OBS MODERATE 55: CPT | Mod: ,,, | Performed by: HOSPITALIST

## 2017-10-10 PROCEDURE — 25000003 PHARM REV CODE 250: Performed by: PLASTIC SURGERY

## 2017-10-10 PROCEDURE — 63600175 PHARM REV CODE 636 W HCPCS: Performed by: SURGERY

## 2017-10-10 PROCEDURE — 94761 N-INVAS EAR/PLS OXIMETRY MLT: CPT

## 2017-10-10 PROCEDURE — 25000003 PHARM REV CODE 250: Performed by: SURGERY

## 2017-10-10 PROCEDURE — 11000001 HC ACUTE MED/SURG PRIVATE ROOM

## 2017-10-10 PROCEDURE — 99900035 HC TECH TIME PER 15 MIN (STAT)

## 2017-10-10 RX ADMIN — SODIUM CHLORIDE, SODIUM LACTATE, POTASSIUM CHLORIDE, AND CALCIUM CHLORIDE: 600; 310; 30; 20 INJECTION, SOLUTION INTRAVENOUS at 03:10

## 2017-10-10 RX ADMIN — CHLORHEXIDINE GLUCONATE 15 ML: 1.2 RINSE ORAL at 08:10

## 2017-10-10 RX ADMIN — CHLORHEXIDINE GLUCONATE 15 ML: 1.2 RINSE ORAL at 09:10

## 2017-10-10 RX ADMIN — DEXAMETHASONE SODIUM PHOSPHATE 8 MG: 4 INJECTION, SOLUTION INTRAMUSCULAR; INTRAVENOUS at 10:10

## 2017-10-10 RX ADMIN — CEFAZOLIN SODIUM 2 G: 2 SOLUTION INTRAVENOUS at 01:10

## 2017-10-10 RX ADMIN — HEPARIN SODIUM 5000 UNITS: 5000 INJECTION, SOLUTION INTRAVENOUS; SUBCUTANEOUS at 01:10

## 2017-10-10 RX ADMIN — HEPARIN SODIUM 5000 UNITS: 5000 INJECTION, SOLUTION INTRAVENOUS; SUBCUTANEOUS at 09:10

## 2017-10-10 RX ADMIN — HEPARIN SODIUM 5000 UNITS: 5000 INJECTION, SOLUTION INTRAVENOUS; SUBCUTANEOUS at 05:10

## 2017-10-10 RX ADMIN — SODIUM CHLORIDE, SODIUM LACTATE, POTASSIUM CHLORIDE, AND CALCIUM CHLORIDE: 600; 310; 30; 20 INJECTION, SOLUTION INTRAVENOUS at 01:10

## 2017-10-10 RX ADMIN — SODIUM CHLORIDE, PRESERVATIVE FREE 3 ML: 5 INJECTION INTRAVENOUS at 05:10

## 2017-10-10 NOTE — OP NOTE
DATE OF PROCEDURE:  10/09/2017    PREOPERATIVE DIAGNOSIS:  Bilateral subcondylar fracture.    POSTOPERATIVE DIAGNOSIS:  Bilateral subcondylar fracture.    PROCEDURES:  Closed reduction of bilateral subcondylar fracture with hybrid MMF.    SURGEON:  Luis M Thapa M.D.    ASSISTANT:  Piter.    ANESTHESIA:  General through nasal intubations.    ESTIMATED BLOOD LOSS:  Minimal.    COMPLICATION:  None.    DISPOSITION:  The patient tolerated procedure well, emerged from general   anesthesia, was extubated in the Operating Room without complication.    DESCRIPTION OF OPERATIVE PROCEDURE:  The patient was taken to the Operating   Room, placed supine on the operating table.  Following induction of general   anesthesia and successful oral intubation, the patient was prepped and draped in   a sterile fashion.  Attention was then directed to the oral cavity, which was   suctioned dry and a hybrid MMF was adapted to the maxilla and mandible that were   secured in place using multiple 8 mm screws.  The patient was then placed in   maxillomandibular fixation using rubber bands.  The patient emerged from general   anesthesia, was extubated in the Operating Room without complication.      HS/HN  dd: 10/09/2017 16:46:00 (CDT)  td: 10/09/2017 21:12:05 (CDT)  Doc ID   #[id number]  Job ID #[id number]    CC:

## 2017-10-10 NOTE — PLAN OF CARE
Problem: Physical Therapy Goal  Goal: Physical Therapy Goal  Goals to be met by: 11/10/2017     Patient will increase functional independence with mobility by performin. Gait x 150 feet independently.  2. Ascend/descend curb step independently  3. Tandem stance eyes open R forward > 10 seconds   4. Tandem stance eyes open L forward > 10 seconds  5. PT to discuss and assess potential use of rollator with pt.    Outcome: Ongoing (interventions implemented as appropriate)  PT evaluation complete. Pt is CGA with OOB activity due to symptoms consistent with orthostatic hypotension.  Sitting: Heart rate: 75 bpm; Blood pressure: 132/74 mmHg  Standing: Heart rate: 101 bpm; Blood pressure: 124/64 mmHg  Pt did not complain of light headedness during sit>stand or during gait. Gait distance was approximately 300 feet. Will continue to follow and progress as tolerated. Please see progress note for detailed plan of care and recommendations.

## 2017-10-10 NOTE — PROGRESS NOTES
63 yo F s/p fall with bilateral subcondylar fractures and lower lip laceration, s/p Closed reduction of mandibular fractures and MMF. Due to possible syncopal episode.0    AVSS - HD Stable  Doing well, no acute complaints. Currently on MMF.   Laceration repairs c/d/i.    A/P:   - Continue CLD  - OOB w assistance.  - follow PT  - DVT ppx   - Pain control  - Awaiting Hospitalist consult recommendations for possible syncopal work up as inpatient vs outpatient upon discharge.  - DC planing pending consult.

## 2017-10-10 NOTE — PT/OT/SLP EVAL
Physical Therapy  Evaluation and Treatment    Mariah Hamilton   MRN: [id number]   Admitting Diagnosis: Bilateral closed fracture of mandible Pre-op Diagnosis: Bilateral closed fracture of mandible, initial encounter [S02.609A] s/p Procedure(s): REDUCTION-CLOSED-MANDIBULAR FRACTURE (ADD ON)     PT Received On: 10/10/17  PT Start Time: 0900     PT Stop Time: 0942    PT Total Time (min): 42 min       Billable Minutes:  Evaluation 25 and Gait [id number]    Diagnosis: Bilateral closed fracture of mandible s/p syncopal episode Pre-op Diagnosis: Bilateral closed fracture of mandible, initial encounter [S02.609A] s/p Procedure(s): REDUCTION-CLOSED-MANDIBULAR FRACTURE (ADD ON)     Past Medical History:   Diagnosis Date    Basal cell carcinoma     to face       Past Surgical History:   Procedure Laterality Date    BASAL CELL CARCINOMA EXCISION Right     cervicofacial flap Right      SECTION       Referring physician: Luis M Thapa MD  Date referred to PT: 10/9/2017    General Precautions: Standard,  (Fall risk. Orthostatic)  Orthopedic Precautions: N/A   Braces: N/A       Do you have any cultural, spiritual, Denominational conflicts, given your current situation?: none    Patient History:  Lives With: alone (Reports she will d/c to daughter's house)  Living Arrangements: house (Daughter's house)  Home Accessibility: stairs to enter home  Number of Stairs to Enter Home: 1  Stair Railings at Home: none  Transportation Available: car, family or friend will provide  Living Environment Comment: Pt lives alone in second story apartment. She reports she will d/c to daughter's 1 story house with 1 LAURA. Pt will have tub shower with shower bench. She reports she is independent with all ADL's and does her own cooking and cleaning. She currently drives. She does not work.  Equipment Currently Used at Home: shower chair  DME owned (not currently used): none    Previous Level of Function:  Ambulation Skills: independent  Transfer  "Skills: independent  ADL Skills: independent  Work/Leisure Activity: independent    Subjective:  Communicated with nurse prior to session.  "I was trying to get back to the couch because I knew I was going down" (re: recent fall at home)    Chief Complaint: Trouble getting off floor  Patient goals: Would like to return to prior living environment    Pain/Comfort  Pain Rating 1: 0/10  Pain Rating Post-Intervention 1: 0/10      Objective:   Patient found with: SCD, peripheral IV sitting edge of bed. Nurse present initially.     Sitting: Heart rate: 75 bpm; Blood pressure: 132/74 mmHg  Standing: Heart rate: 101 bpm; Blood pressure: 124/64 mmHg  Sitting after gait: Heart rate: 94 bpm; Blood pressure: 133/69 mmHg     Cognitive Exam:  Oriented to: Person, Place, Time and Situation    Follows Commands/attention: Follows two-step commands  Communication: clear/fluent able to make wants and needs known  Safety awareness/insight to disability: impaired (does not see benefit of continued PT to decrease fall risk despite explanation)    Physical Exam:  Postural examination/scapula alignment: Rounded shoulder and Head forward    Skin integrity: Visible skin intact with minor bruising noted on anterior jaw and wound on chin  Edema: None noted    Sensation:   Intact to light touch on BLE    Lower Extremity Range of Motion:  Right Lower Extremity: WFL  Left Lower Extremity: WFL    Lower Extremity Strength with gross MMT:  Right Lower Extremity: WFL  Left Lower Extremity: WFL    No coordination or tone impairments identified with below mobility; no formal testing performed.     Functional Mobility:  Bed Mobility:  Scooting/Bridging: Supervision (increased time)  Sit to Supine: Modified Independent (increased time)    Transfers:  Sit <> Stand Assistance: Supervision (x3 trials from edge of bed. Pt did not report symptoms consistent with orthostatic hypotension but blood pressure decreased from 132/74 to 124/64 with pulse increasing " "from 75 bpm to 101 bpm)  Sit <> Stand Assistive Device: No Assistive Device    Gait:   Gait Distance: Pt walked approximately 300 ft with CGA and no AD. Pt demonstrated decreased armswing bilaterally and was very externally rotated at hip with "toe out" posture. Pt demonstrated increased horizontal sway and general unsteadiness with horizontal and vertical head turns. Pt unable to perform tandom gait without LOB which she was able to self-correct with stepping strategy with CGA.  Assistance 1: Contact Guard Assistance  Gait Assistive Device: No device  Gait Pattern: reciprocal  Gait Deviation(s): decreased step length, decreased toe-to-floor clearance, decreased weight-shifting ability    Balance:   Static Sit: GOOD: Takes MODERATE challenges from all directions  Dynamic Sit: GOOD: Maintains balance through MODERATE excursions of active trunk movement  Static Stand: GOOD: Takes MODERATE challenges from all directions  Dynamic stand: FAIR+: Needs CLOSE SUPERVISION during gait and is able to right self with minor LOB     Education:  Discussed safety with pt such as transitioning slowly from supine>sit>stand, being aware of any light headedness or dizziness,  as well as not staying in bed for long periods of time.    AM-PAC 6 CLICK MOBILITY  How much help from another person does this patient currently need?   1 = Unable, Total/Dependent Assistance  2 = A lot, Maximum/Moderate Assistance  3 = A little, Minimum/Contact Guard/Supervision  4 = None, Modified [place]/Independent    Turning over in bed (including adjusting bedclothes, sheets and blankets)?: 4  Sitting down on and standing up from a chair with arms (e.g., wheelchair, bedside commode, etc.): 3  Moving from lying on back to sitting on the side of the bed?: 4  Moving to and from a bed to a chair (including a wheelchair)?: 3  Need to walk in hospital room?: 3  Climbing 3-5 steps with a railing?: 3  Total Score: 20     AM-PAC Raw Score CMS G-Code Modifier " Level of Impairment Assistance   6 % Total / Unable   7 - 9 CM 80 - 100% Maximal Assist   10 - 14 CL 60 - 80% Moderate Assist   15 - 19 CK 40 - 60% Moderate Assist   20 - 22 CJ 20 - 40% Minimal Assist   23 CI 1-20% SBA / CGA   24 CH 0% Independent/ Mod I     Patient left supine per her preference (Declined up in chair) with call button in reach.    Assessment:   Mariah Hamilton is a 62 y.o. female with a medical diagnosis of Bilateral closed fracture of mandible s/p syncopal episode Pre-op Diagnosis: Bilateral closed fracture of mandible, initial encounter [S02.609A] s/p Procedure(s): REDUCTION-CLOSED-MANDIBULAR FRACTURE  PT evaluation complete. She is CGA with OOB activities due to drop in blood pressure from 132/74 mmHg to 124/64 mmHg with pulse rate rising from 75 to 101 bpm after sit>stand. Pt could benefit from PT to improve balance and to be further educated on compensatory methods when living with symptoms consistent with orthostatic hypotension. PT recommends outpatient PT upon d/c pending daughter's availability to provide transportation or pt being medically cleared to continue driving. Home health may be alternative if safe transportation not available.    Rehab identified problem list/impairments: Rehab identified problem list/impairments: weakness, gait instability, impaired balance, impaired functional mobilty, decreased lower extremity function    Rehab potential is good.    Activity tolerance: Good    Discharge recommendations: Discharge Facility/Level Of Care Needs: outpatient PT (Pending daughter availability to drive pt to appointments or pt being medically cleared to drive. Home health possible alternative. Pt may not be agreeable to continuing PT after d/c )     Barriers to discharge: Barriers to Discharge: None    Equipment recommendations: Equipment Needed After Discharge:  (Pt may benefit from rollator. Will discuss at next session if pt is agreeable)     GOALS:    Physical Therapy Goals         Problem: Physical Therapy Goal    Goal Priority Disciplines Outcome Goal Variances Interventions   Physical Therapy Goal     PT/OT, PT Ongoing (interventions implemented as appropriate)     Description:  Goals to be met by: 11/10/2017     Patient will increase functional independence with mobility by performin. Gait x 150 feet independently.  2. Ascend/descend curb step independently  3. Tandem stance eyes open R forward > 10 seconds   4. Tandem stance eyes open L forward > 10 seconds  5. PT to discuss and assess potential use of rollator with pt.                      PLAN:    Patient to be seen 3 x/week to address the above listed problems via gait training, neuromuscular re-education  Plan of Care expires: 11/10/17  Plan of Care reviewed with: patient          Van Carter, SPT  10/10/2017     I certify that I was present in the room directing the student in service delivery and guiding them using my skilled judgment. As the co-signing therapist I have reviewed the students documentation and am responsible for the treatment, assessment, and plan.     Miryam Nj, PT 10/10/2017

## 2017-10-11 VITALS
TEMPERATURE: 98 F | HEIGHT: 63 IN | BODY MASS INDEX: 25.43 KG/M2 | HEART RATE: 80 BPM | WEIGHT: 143.5 LBS | SYSTOLIC BLOOD PRESSURE: 132 MMHG | OXYGEN SATURATION: 95 % | RESPIRATION RATE: 16 BRPM | DIASTOLIC BLOOD PRESSURE: 70 MMHG

## 2017-10-11 PROCEDURE — 99232 SBSQ HOSP IP/OBS MODERATE 35: CPT | Mod: ,,, | Performed by: HOSPITALIST

## 2017-10-11 PROCEDURE — 63600175 PHARM REV CODE 636 W HCPCS: Performed by: PLASTIC SURGERY

## 2017-10-11 PROCEDURE — 94761 N-INVAS EAR/PLS OXIMETRY MLT: CPT

## 2017-10-11 PROCEDURE — 25000003 PHARM REV CODE 250: Performed by: PLASTIC SURGERY

## 2017-10-11 PROCEDURE — 97116 GAIT TRAINING THERAPY: CPT

## 2017-10-11 PROCEDURE — 99900035 HC TECH TIME PER 15 MIN (STAT)

## 2017-10-11 RX ORDER — CHLORHEXIDINE GLUCONATE ORAL RINSE 1.2 MG/ML
15 SOLUTION DENTAL
Qty: 473 ML | Refills: 0 | Status: SHIPPED | OUTPATIENT
Start: 2017-10-11 | End: 2017-10-25

## 2017-10-11 RX ORDER — OXYCODONE HCL 5 MG/5 ML
5 SOLUTION, ORAL ORAL EVERY 4 HOURS PRN
Qty: 200 ML | Refills: 0 | Status: SHIPPED | OUTPATIENT
Start: 2017-10-11 | End: 2017-12-04 | Stop reason: CLARIF

## 2017-10-11 RX ORDER — HYDROCODONE BITARTRATE AND ACETAMINOPHEN 5; 325 MG/1; MG/1
1 TABLET ORAL EVERY 4 HOURS PRN
Status: DISCONTINUED | OUTPATIENT
Start: 2017-10-11 | End: 2017-10-11 | Stop reason: HOSPADM

## 2017-10-11 RX ORDER — SODIUM CHLORIDE 9 MG/ML
INJECTION, SOLUTION INTRAVENOUS CONTINUOUS
Status: DISCONTINUED | OUTPATIENT
Start: 2017-10-11 | End: 2017-10-11 | Stop reason: HOSPADM

## 2017-10-11 RX ADMIN — CHLORHEXIDINE GLUCONATE 15 ML: 1.2 RINSE ORAL at 08:10

## 2017-10-11 RX ADMIN — SODIUM CHLORIDE, SODIUM LACTATE, POTASSIUM CHLORIDE, AND CALCIUM CHLORIDE: 600; 310; 30; 20 INJECTION, SOLUTION INTRAVENOUS at 05:10

## 2017-10-11 RX ADMIN — HEPARIN SODIUM 5000 UNITS: 5000 INJECTION, SOLUTION INTRAVENOUS; SUBCUTANEOUS at 05:10

## 2017-10-11 NOTE — PLAN OF CARE
Problem: Physical Therapy Goal  Goal: Physical Therapy Goal  Goals to be met by: 11/10/2017     Patient will increase functional independence with mobility by performin. Gait x 150 feet independently.  2. Ascend/descend curb step independently  3. Tandem stance eyes open R forward > 10 seconds   4. Tandem stance eyes open L forward > 10 seconds  5. Gait trial with rollator.   6. Ascend/descend curb step with or without AD.      Outcome: Revised  PT goals revised as above.

## 2017-10-11 NOTE — HOSPITAL COURSE
The patient has an uneventful post-op course.  She is not orthostatic with BP measurements.  The patient should follow up with her PCP within a few days for continued evaluation of syncopal episode that caused the fall.  No arrhythmias noted on telemetry monitoring.

## 2017-10-11 NOTE — CONSULTS
[facility]  Hospital Medicine  Consult Note    Patient Name: Mariah Hamilton  MRN: [id number]  Admission Date: 10/8/2017  Hospital Length of Stay: 2 days  Attending Physician: Luis M Thapa MD   Primary Care Provider: Primary Doctor No           Patient information was obtained from patient and past medical records.     Consults  Subjective:     Principal Problem: Bilateral closed fracture of mandible    Chief Complaint:   Chief Complaint   Patient presents with    Transfer     transfer from [facility] (accepted by [facility]) for further evaluation of OMFS; report pt had syncopal episode earlier today, falling forward striking her face        HPI: This is a 62 year old female with a history of basal cell carcinoma s/p excision, who presents with a bilateral mandible fracture after a fall.  The patient is s/p a closed reduction of the bilateral subcondylar fracture.  Hospital Medicine is consulted for medical management.    Past Medical History:   Diagnosis Date    Basal cell carcinoma     to face        Past Surgical History:   Procedure Laterality Date    BASAL CELL CARCINOMA EXCISION Right     cervicofacial flap Right      SECTION         Review of patient's allergies indicates:  No Known Allergies    No current facility-administered medications on file prior to encounter.      No current outpatient prescriptions on file prior to encounter.     Family History     Problem Relation (Age of Onset)    Asthma Father    Basal cell carcinoma Brother    Cancer Brother    Depression Mother    No Known Problems Daughter        Social History Main Topics    Smoking status: Former Smoker     Packs/day: 0.25     Years: 30.00     Start date: 1979     Quit date: 2009    Smokeless tobacco: Never Used    Alcohol use No    Drug use: No    Sexual activity: No     Review of Systems   Constitutional: Negative for activity change and fever.   HENT: Negative for congestion and  trouble swallowing.    Respiratory: Negative for shortness of breath.    Cardiovascular: Negative for chest pain.   Gastrointestinal: Negative for abdominal pain.   Genitourinary: Negative for difficulty urinating.   Musculoskeletal: Negative for arthralgias and neck pain.   Neurological: Negative for headaches.   Psychiatric/Behavioral: The patient is not nervous/anxious.      Objective:     Vital Signs (Most Recent):  Temp: 97.5 °F (36.4 °C) (10/11/17 0412)  Pulse: 75 (10/11/17 0412)  Resp: 18 (10/11/17 0412)  BP: (!) 157/74 (10/11/17 0412)  SpO2: (!) 94 % (10/11/17 0412) Vital Signs (24h Range):  Temp:  [96.4 °F (35.8 °C)-98.2 °F (36.8 °C)] 97.5 °F (36.4 °C)  Pulse:  [] 75  Resp:  [14-18] 18  SpO2:  [94 %-97 %] 94 %  BP: (120-157)/(56-74) 157/74     Weight: 65.1 kg (143 lb 8 oz)  Body mass index is 25.42 kg/m².    Physical Exam   Constitutional: She is oriented to person, place, and time. She appears well-developed and well-nourished.   Pleasant female in no distress with facial sutures intact   Eyes: Conjunctivae are normal. Pupils are equal, round, and reactive to light.   Neck: Neck supple. No thyromegaly present.   Cardiovascular: Normal rate, regular rhythm, normal heart sounds and intact distal pulses.  Exam reveals no gallop and no friction rub.    No murmur heard.  Pulmonary/Chest: Effort normal and breath sounds normal. No respiratory distress. She has no wheezes. She has no rales.   Abdominal: Soft. Bowel sounds are normal. She exhibits no distension and no mass. There is no tenderness. There is no rebound and no guarding.   Musculoskeletal: Normal range of motion.   Lymphadenopathy:     She has no cervical adenopathy.   Neurological: She is alert and oriented to person, place, and time. She has normal reflexes. She displays normal reflexes. No cranial nerve deficit. She exhibits normal muscle tone. Coordination normal.   Skin: Skin is warm and dry. No rash noted. No erythema. No pallor.    Positive facial abrasions are clean   Psychiatric: She has a normal mood and affect. Her behavior is normal. Judgment and thought content normal.       Significant Labs: All pertinent labs within the past 24 hours have been reviewed.    Significant Imaging: I have reviewed and interpreted all pertinent imaging results/findings within the past 24 hours.    Assessment/Plan:     * Bilateral closed fracture of mandible    The patient is hemodynamically stable  DVT prophylaxis employed  Adequate pain control             VTE Risk Mitigation         Ordered     heparin (porcine) injection 5,000 Units  Every 8 hours     Route:  Subcutaneous        10/09/17 1819     Place sequential compression device  Until discontinued      10/09/17 1819     Place JONATHAN hose  Until discontinued      10/09/17 1819     Medium Risk of VTE  Once      10/09/17 1819     Place sequential compression device  Until discontinued      10/09/17 1819     Place JONATHAN hose  Until discontinued      10/09/17 0043     Place sequential compression device  Until discontinued      10/09/17 0043              Thank you for your consult. I will follow-up with patient. Please contact us if you have any additional questions.    Lucero Barber MD  Department of Hospital Medicine   [facility]

## 2017-10-11 NOTE — SUBJECTIVE & OBJECTIVE
Interval History: The patient has no complaints this am.    Review of Systems   Constitutional: Negative for activity change and fever.   HENT: Negative for congestion and trouble swallowing.    Respiratory: Negative for shortness of breath.    Cardiovascular: Negative for chest pain.   Gastrointestinal: Negative for abdominal pain.   Genitourinary: Negative for difficulty urinating.   Musculoskeletal: Negative for arthralgias and neck pain.   Neurological: Negative for headaches.   Psychiatric/Behavioral: The patient is not nervous/anxious.      Objective:     Vital Signs (Most Recent):  Temp: 97.9 °F (36.6 °C) (10/11/17 0800)  Pulse: 84 (10/11/17 0800)  Resp: 18 (10/11/17 0800)  BP: (!) 165/74 (10/11/17 0800)  SpO2: (!) 94 % (10/11/17 0800) Vital Signs (24h Range):  Temp:  [97.2 °F (36.2 °C)-98.2 °F (36.8 °C)] 97.9 °F (36.6 °C)  Pulse:  [58-84] 84  Resp:  [14-18] 18  SpO2:  [94 %-97 %] 94 %  BP: (120-165)/(56-74) 165/74     Weight: 65.1 kg (143 lb 8 oz)  Body mass index is 25.42 kg/m².    Intake/Output Summary (Last 24 hours) at 10/11/17 1037  Last data filed at 10/11/17 0600   Gross per 24 hour   Intake              240 ml   Output                0 ml   Net              240 ml      Physical Exam   Constitutional: She is oriented to person, place, and time. She appears well-developed and well-nourished.   Pleasant female in no distress with facial sutures intact   Eyes: Conjunctivae are normal. Pupils are equal, round, and reactive to light.   Neck: Neck supple. No thyromegaly present.   Cardiovascular: Normal rate, regular rhythm, normal heart sounds and intact distal pulses.  Exam reveals no gallop and no friction rub.    No murmur heard.  Pulmonary/Chest: Effort normal and breath sounds normal. No respiratory distress. She has no wheezes. She has no rales.   Abdominal: Soft. Bowel sounds are normal. She exhibits no distension and no mass. There is no tenderness. There is no rebound and no guarding.    Musculoskeletal: Normal range of motion.   Lymphadenopathy:     She has no cervical adenopathy.   Neurological: She is alert and oriented to person, place, and time. She has normal reflexes. She displays normal reflexes. No cranial nerve deficit. She exhibits normal muscle tone. Coordination normal.   Skin: Skin is warm and dry. No rash noted. No erythema. No pallor.   Positive facial abrasions are clean   Psychiatric: She has a normal mood and affect. Her behavior is normal. Judgment and thought content normal.     Significant Imaging: I have reviewed and interpreted all pertinent imaging results/findings within the past 24 hours.

## 2017-10-11 NOTE — HPI
This is a 62 year old female with a history of basal cell carcinoma s/p excision, who presents with a bilateral mandible fracture after a fall.  The patient is s/p a closed reduction of the bilateral subcondylar fracture.  Hospital Medicine is consulted for medical management.

## 2017-10-11 NOTE — PLAN OF CARE
Problem: Physical Therapy Goal  Goal: Physical Therapy Goal  Goals to be met by: 11/10/2017     Patient will increase functional independence with mobility by performin. Gait x 150 feet independently.  2. Ascend/descend curb step independently  3. Tandem stance eyes open R forward > 10 seconds   4. Tandem stance eyes open L forward > 10 seconds  5. PT to discuss and assess potential use of rollator with pt.     Outcome: Ongoing (interventions implemented as appropriate)    Slowly progressing towards goals, unsteady with gait training

## 2017-10-11 NOTE — DISCHARGE SUMMARY
Plastic Surgery Discharge Summary    Admission date: 10/8/2017 10:28 PM  Discharge date: No discharge date for patient encounter.    Admission Dx: Bilateral subcondylar fx s/p fall  Discharge Dx: same    Attending: [facility] MD: Vijay    Hospital Course:  Patient was admitted for bilateral subcondylar fx and lip laceration suffered during fall. She underwent closed reduction and MMF with hybrid arch bars with elastics. She did well throughout her hospital stay. We consulted medicine to determine if she needed inpatient syncope work-up for which they said that she was stable for outpatient follow-up. She was discharged home to RTC in 1-2 weeks.    Procedure: Closed reduction of bilateral subcondylar fx with MMF    PE:  Pre-morbid occlusion intact    Discharge instructions:  Do not drive or operate heavy machinery when taking narcotic pain medicine.  Soft diet (anything that can be taken through straw)  Resume home activities  Can brush and rinse mouth after meals and at bedtime  Can replace rubber bands if they snap  Call Dr Bhakta's office to f/u in 1-2 weeks.    Tobin Linares  [employer] Plastic Surgery PGY4  Pager [phone]

## 2017-10-11 NOTE — PROGRESS NOTES
[facility] Hospital Medicine  Progress Note    Patient Name: Mariah Hamilton  MRN: [id number]  Patient Class: IP- Inpatient   Admission Date: 10/8/2017  Length of Stay: 2 days  Attending Physician: Luis M Thapa MD  Primary Care Provider: Primary Doctor No        Subjective:     Principal Problem:Bilateral closed fracture of mandible    HPI:  This is a 62 year old female with a history of basal cell carcinoma s/p excision, who presents with a bilateral mandible fracture after a fall.  The patient is s/p a closed reduction of the bilateral subcondylar fracture.  Hospital Medicine is consulted for medical management.    Hospital Course:  The patient has an uneventful post-op course.  She is not orthostatic with BP measurements.  The patient should follow up with her PCP within a few days for continued evaluation of syncopal episode that caused the fall.  No arrhythmias noted on telemetry monitoring.    Interval History: The patient has no complaints this am.    Review of Systems   Constitutional: Negative for activity change and fever.   HENT: Negative for congestion and trouble swallowing.    Respiratory: Negative for shortness of breath.    Cardiovascular: Negative for chest pain.   Gastrointestinal: Negative for abdominal pain.   Genitourinary: Negative for difficulty urinating.   Musculoskeletal: Negative for arthralgias and neck pain.   Neurological: Negative for headaches.   Psychiatric/Behavioral: The patient is not nervous/anxious.      Objective:     Vital Signs (Most Recent):  Temp: 97.9 °F (36.6 °C) (10/11/17 0800)  Pulse: 84 (10/11/17 0800)  Resp: 18 (10/11/17 0800)  BP: (!) 165/74 (10/11/17 0800)  SpO2: (!) 94 % (10/11/17 0800) Vital Signs (24h Range):  Temp:  [97.2 °F (36.2 °C)-98.2 °F (36.8 °C)] 97.9 °F (36.6 °C)  Pulse:  [58-84] 84  Resp:  [14-18] 18  SpO2:  [94 %-97 %] 94 %  BP: (120-165)/(56-74) 165/74     Weight: 65.1 kg (143 lb 8 oz)  Body mass index is 25.42  kg/m².    Intake/Output Summary (Last 24 hours) at 10/11/17 1037  Last data filed at 10/11/17 0600   Gross per 24 hour   Intake              240 ml   Output                0 ml   Net              240 ml      Physical Exam   Constitutional: She is oriented to person, place, and time. She appears well-developed and well-nourished.   Pleasant female in no distress with facial sutures intact   Eyes: Conjunctivae are normal. Pupils are equal, round, and reactive to light.   Neck: Neck supple. No thyromegaly present.   Cardiovascular: Normal rate, regular rhythm, normal heart sounds and intact distal pulses.  Exam reveals no gallop and no friction rub.    No murmur heard.  Pulmonary/Chest: Effort normal and breath sounds normal. No respiratory distress. She has no wheezes. She has no rales.   Abdominal: Soft. Bowel sounds are normal. She exhibits no distension and no mass. There is no tenderness. There is no rebound and no guarding.   Musculoskeletal: Normal range of motion.   Lymphadenopathy:     She has no cervical adenopathy.   Neurological: She is alert and oriented to person, place, and time. She has normal reflexes. She displays normal reflexes. No cranial nerve deficit. She exhibits normal muscle tone. Coordination normal.   Skin: Skin is warm and dry. No rash noted. No erythema. No pallor.   Positive facial abrasions are clean   Psychiatric: She has a normal mood and affect. Her behavior is normal. Judgment and thought content normal.     Significant Imaging: I have reviewed and interpreted all pertinent imaging results/findings within the past 24 hours.    Assessment/Plan:      * Bilateral closed fracture of mandible    The patient is hemodynamically stable  No further inpatient evaluation for possible syncope  Patient to follow up with PCP within 3-4 days  Patient medically optimized for discharge            VTE Risk Mitigation         Ordered     heparin (porcine) injection 5,000 Units  Every 8 hours     Route:   Subcutaneous        10/09/17 1819     Place sequential compression device  Until discontinued      10/09/17 1819     Place JONATHAN hose  Until discontinued      10/09/17 1819     Medium Risk of VTE  Once      10/09/17 1819     Place sequential compression device  Until discontinued      10/09/17 1819     Place JONATHAN hose  Until discontinued      10/09/17 0043     Place sequential compression device  Until discontinued      10/09/17 0043              Lucero Barber MD  Department of Hospital Medicine   [facility]

## 2017-10-11 NOTE — PLAN OF CARE
Problem: Patient Care Overview  Goal: Plan of Care Review  Outcome: Ongoing (interventions implemented as appropriate)  Pt on RA;SPO2 95% with no distress. No changes in therapy.Will continue to monitor.

## 2017-10-11 NOTE — PLAN OF CARE
10/11/17 1724   Final Note   Assessment Type Final Discharge Note   Discharge Disposition Home   What phone number can be called within the next 1-3 days to see how you are doing after discharge? ([phone])   Hospital Follow Up  Appt(s) scheduled? No   Discharge plans and expectations educations in teach back method with documentation complete? Yes   Right Care Referral Info   Post Acute Recommendation No Care

## 2017-10-11 NOTE — DISCHARGE INSTRUCTIONS
Do not drive or operate heavy machinery when taking narcotic pain medicine.  Soft diet (anything that can be taken through straw)  Resume home activities  Can brush and rinse mouth after meals and at bedtime  Can replace rubber bands if they snap  Call Dr Bhakta's office to f/u in 1-2 weeks.  Please follow-up with PCP regarding syncope work-up in 1-3 days.

## 2017-10-11 NOTE — ASSESSMENT & PLAN NOTE
The patient is hemodynamically stable  No further inpatient evaluation for possible syncope  Patient to follow up with PCP within 3-4 days  Patient medically optimized for discharge

## 2017-10-11 NOTE — PT/OT/SLP PROGRESS
"Physical Therapy  Treatment    Mariah Hamilton   MRN: [id number]   Admitting Diagnosis: Bilateral closed fracture of mandible    PT Received On: 10/11/17  PT Start Time: 1000     PT Stop Time: 1023    PT Total Time (min): 23 min       Billable Minutes:  Gait 23    Treatment Type: Treatment  PT/PTA: PTA     PTA Visit Number: 1       General Precautions: Standard,  (fall risk, orthostatic)  Orthopedic Precautions: N/A   Braces: N/A    Do you have any cultural, spiritual, Yazdanism conflicts, given your current situation?: none    Subjective:  Communicated with nurse prior to session. Patient stated " I feel ok"     Pain/Comfort  Pain Rating 1: 0/10  Pain Rating Post-Intervention 1: 0/10    Objective:   Patient found with: peripheral IV, SCD supine in bed     Functional Mobility:  Bed Mobility:   Scooting/Bridging: Modified Independent  Supine to Sit: Modified Independent    Transfers: sit to stand from bed   Sit <> Stand Assistance: Supervision  Sit <> Stand Assistive Device: No Assistive Device    Gait:   Gait Distance:  (200 feet w/o AD required CGA with unsteady gait. patient then ambulated 150 feet with rollator with close SBA. Balance improved. )  Assistance 1: Contact Guard Assistance  Gait Assistive Device: No device  Gait Pattern: reciprocal  Gait Deviation(s): decreased mohsen, decreased step length, decreased weight-shifting ability    Stairs:  Ascend/descend full flight of stairs with L HR with CGA     Therapeutic Activities and Exercises:  Educated patient on the importance of using AD for stability with gait. Educated patient on rollator so she could sit when feeling dizzy     AM-PAC 6 CLICK MOBILITY  How much help from another person does this patient currently need?   1 = Unable, Total/Dependent Assistance  2 = A lot, Maximum/Moderate Assistance  3 = A little, Minimum/Contact Guard/Supervision  4 = None, Modified [place]/Independent    Turning over in bed (including adjusting bedclothes, sheets and " blankets)?: 4  Sitting down on and standing up from a chair with arms (e.g., wheelchair, bedside commode, etc.): 3  Moving from lying on back to sitting on the side of the bed?: 3  Moving to and from a bed to a chair (including a wheelchair)?: 3  Need to walk in hospital room?: 3  Climbing 3-5 steps with a railing?: 3  Total Score: 19    AM-PAC Raw Score CMS G-Code Modifier Level of Impairment Assistance   6 % Total / Unable   7 - 9 CM 80 - 100% Maximal Assist   10 - 14 CL 60 - 80% Moderate Assist   15 - 19 CK 40 - 60% Moderate Assist   20 - 22 CJ 20 - 40% Minimal Assist   23 CI 1-20% SBA / CGA   24 CH 0% Independent/ Mod I     Patient left up in chair with all lines intact, call button in reach and nurse notified.    Assessment:  Mariah Hamilton is a 62 y.o. female with a medical diagnosis of Bilateral closed fracture of mandible  Patient tolerated treatment session fairly well and would benefit from using AD for stability. Patient is refusing to be discharged with AD. Patient would benefit from OP PT to work on balance and functional mobility.     Rehab identified problem list/impairments: Rehab identified problem list/impairments: weakness, impaired endurance, impaired functional mobilty, gait instability, decreased ROM, impaired skin    Rehab potential is good.    Activity tolerance: Good    Discharge recommendations: Discharge Facility/Level Of Care Needs: outpatient PT     Barriers to discharge: Barriers to Discharge: None    Equipment recommendations: Equipment Needed After Discharge:  (patient is refusing to use AD )     GOALS:    Physical Therapy Goals        Problem: Physical Therapy Goal    Goal Priority Disciplines Outcome Goal Variances Interventions   Physical Therapy Goal     PT/OT, PT Ongoing (interventions implemented as appropriate)     Description:  Goals to be met by: 11/10/2017     Patient will increase functional independence with mobility by performin. Gait x 150 feet  independently.  2. Ascend/descend curb step independently  3. Tandem stance eyes open R forward > 10 seconds   4. Tandem stance eyes open L forward > 10 seconds  5. PT to discuss and assess potential use of rollator with pt.                      PLAN:    Patient to be seen 3 x/week  to address the above listed problems via gait training, neuromuscular re-education  Plan of Care expires: 11/10/17  Plan of Care reviewed with: patient         Jackie MCCAIN Patrick, PTA  10/11/2017

## 2017-10-11 NOTE — SUBJECTIVE & OBJECTIVE
Past Medical History:   Diagnosis Date    Basal cell carcinoma     to face        Past Surgical History:   Procedure Laterality Date    BASAL CELL CARCINOMA EXCISION Right     cervicofacial flap Right      SECTION         Review of patient's allergies indicates:  No Known Allergies    No current facility-administered medications on file prior to encounter.      No current outpatient prescriptions on file prior to encounter.     Family History     Problem Relation (Age of Onset)    Asthma Father    Basal cell carcinoma Brother    Cancer Brother    Depression Mother    No Known Problems Daughter        Social History Main Topics    Smoking status: Former Smoker     Packs/day: 0.25     Years: 30.00     Start date: 1979     Quit date: 2009    Smokeless tobacco: Never Used    Alcohol use No    Drug use: No    Sexual activity: No     Review of Systems   Constitutional: Negative for activity change and fever.   HENT: Negative for congestion and trouble swallowing.    Respiratory: Negative for shortness of breath.    Cardiovascular: Negative for chest pain.   Gastrointestinal: Negative for abdominal pain.   Genitourinary: Negative for difficulty urinating.   Musculoskeletal: Negative for arthralgias and neck pain.   Neurological: Negative for headaches.   Psychiatric/Behavioral: The patient is not nervous/anxious.      Objective:     Vital Signs (Most Recent):  Temp: 97.5 °F (36.4 °C) (10/11/17 0412)  Pulse: 75 (10/11/17 0412)  Resp: 18 (10/11/17 0412)  BP: (!) 157/74 (10/11/17 0412)  SpO2: (!) 94 % (10/11/17 0412) Vital Signs (24h Range):  Temp:  [96.4 °F (35.8 °C)-98.2 °F (36.8 °C)] 97.5 °F (36.4 °C)  Pulse:  [] 75  Resp:  [14-18] 18  SpO2:  [94 %-97 %] 94 %  BP: (120-157)/(56-74) 157/74     Weight: 65.1 kg (143 lb 8 oz)  Body mass index is 25.42 kg/m².    Physical Exam   Constitutional: She is oriented to person, place, and time. She appears well-developed and well-nourished.   Pleasant  female in no distress with facial sutures intact   Eyes: Conjunctivae are normal. Pupils are equal, round, and reactive to light.   Neck: Neck supple. No thyromegaly present.   Cardiovascular: Normal rate, regular rhythm, normal heart sounds and intact distal pulses.  Exam reveals no gallop and no friction rub.    No murmur heard.  Pulmonary/Chest: Effort normal and breath sounds normal. No respiratory distress. She has no wheezes. She has no rales.   Abdominal: Soft. Bowel sounds are normal. She exhibits no distension and no mass. There is no tenderness. There is no rebound and no guarding.   Musculoskeletal: Normal range of motion.   Lymphadenopathy:     She has no cervical adenopathy.   Neurological: She is alert and oriented to person, place, and time. She has normal reflexes. She displays normal reflexes. No cranial nerve deficit. She exhibits normal muscle tone. Coordination normal.   Skin: Skin is warm and dry. No rash noted. No erythema. No pallor.   Positive facial abrasions are clean   Psychiatric: She has a normal mood and affect. Her behavior is normal. Judgment and thought content normal.       Significant Labs: All pertinent labs within the past 24 hours have been reviewed.    Significant Imaging: I have reviewed and interpreted all pertinent imaging results/findings within the past 24 hours.

## 2017-10-13 NOTE — PROGRESS NOTES
Physical Therapy Discharge Summary    Mariah Hamilton  MRN: [id number]   Bilateral closed fracture of mandible   Patient Discharged from acute Physical Therapy on 10/11/17.  Please refer to prior PT noted date on 10/11/17 for functional status.     Assessment:   Goals partially met.  GOALS:    Physical Therapy Goals     Not on file          Multidisciplinary Problems (Resolved)        Problem: Physical Therapy Goal    Goal Priority Disciplines Outcome Goal Variances Interventions   Physical Therapy Goal   (Resolved)     PT/OT, PT Outcome(s) achieved     Description:  Goals to be met by: 11/10/2017     Patient will increase functional independence with mobility by performin. Gait x 150 feet independently.  2. Ascend/descend curb step independently  3. Tandem stance eyes open R forward > 10 seconds   4. Tandem stance eyes open L forward > 10 seconds  5. Gait trial with rollator.   6. Ascend/descend curb step with or without AD.                      Reasons for Discontinuation of Therapy Services  Transfer to alternate level of care.      Plan:  Patient Discharged to: home with outpatient PT recommended.

## 2017-12-04 ENCOUNTER — ANESTHESIA EVENT (OUTPATIENT)
Dept: SURGERY | Facility: OTHER | Age: 62
End: 2017-12-04
Payer: MEDICARE

## 2017-12-04 ENCOUNTER — HOSPITAL ENCOUNTER (OUTPATIENT)
Dept: PREADMISSION TESTING | Facility: OTHER | Age: 62
Discharge: HOME | End: 2017-12-04
Attending: PLASTIC SURGERY
Payer: MEDICARE

## 2017-12-04 VITALS
BODY MASS INDEX: 24.8 KG/M2 | TEMPERATURE: 99 F | OXYGEN SATURATION: 97 % | HEIGHT: 63 IN | DIASTOLIC BLOOD PRESSURE: 67 MMHG | HEART RATE: 66 BPM | WEIGHT: 140 LBS | SYSTOLIC BLOOD PRESSURE: 127 MMHG

## 2017-12-04 RX ORDER — SODIUM CHLORIDE, SODIUM LACTATE, POTASSIUM CHLORIDE, CALCIUM CHLORIDE 600; 310; 30; 20 MG/100ML; MG/100ML; MG/100ML; MG/100ML
INJECTION, SOLUTION INTRAVENOUS CONTINUOUS
Status: CANCELLED | OUTPATIENT
Start: 2017-12-04

## 2017-12-04 RX ORDER — FAMOTIDINE 20 MG/1
20 TABLET, FILM COATED ORAL
Status: CANCELLED | OUTPATIENT
Start: 2017-12-04 | End: 2017-12-04

## 2017-12-04 NOTE — ANESTHESIA PREPROCEDURE EVALUATION
12/04/2017  Mariah Hamilton is a 62 y.o., female.    Anesthesia Evaluation    I have reviewed the Patient Summary Reports.    I have reviewed the Nursing Notes.   I have reviewed the Medications.     Review of Systems  Anesthesia Hx:  History of prior surgery of interest to airway management or planning: jaw. Previous anesthesia: General October 2017, mandibular fracture, [place] easy with general anesthesia.  Procedure performed at an [facility]. Airway issues documented on chart review include mask, easy  Denies Family Hx of Anesthesia complications.   Denies Personal Hx of Anesthesia complications.   Social:  Former Smoker    Hematology/Oncology:  Hematology Normal       -- Cancer in past history (skin):    EENT/Dental:EENT/Dental Normal   Cardiovascular:  Cardiovascular Normal  Syncope at home in October 2017, fell and broke mandible  W/u neg head CT, no cardiac hx or sx's.   Sent to [facility] for syncopal w/u per Dr. Thapa. Labs unremarkable except for probable DM, CKD, hypercalcemia   Pulmonary:  Pulmonary Normal    Renal/:   Chronic Renal Disease, CRI    Hepatic/GI:  Hepatic/GI Normal    Musculoskeletal:  Musculoskeletal Normal    Neurological:  Neurology Normal    Endocrine:  Endocrine Normal    Dermatological:  Skin Normal    Psych:  Psychiatric Normal           Physical Exam  General:  Well nourished    Airway/Jaw/Neck:  Airway Findings: Mouth Opening: Small, but > 3cm General Airway Assessment: Possible difficult intubation, Possible difficult mask airway  Mallampati: III  Improves to II with phonation.  TM Distance: < 4 cm  Jaw/Neck Findings:  Neck ROM: Normal ROM      Dental:  Dental Findings: Prominent Incisors         Mental Status:  Mental Status Findings:  Cooperative, Alert and Oriented         Anesthesia Plan  Type of Anesthesia, risks & benefits discussed:  Anesthesia Type:   general  Patient's Preference:   Intra-op Monitoring Plan: standard ASA monitors  Intra-op Monitoring Plan Comments:   Post Op Pain Control Plan:   Post Op Pain Control Plan Comments:   Induction:   IV  Beta Blocker:         Informed Consent: Patient understands risks and agrees with Anesthesia plan.  Questions answered. Anesthesia consent signed with patient.  ASA Score: 3     Day of Surgery Review of History & Physical:    H&P update referred to the surgeon.     Anesthesia Plan Notes: Sent to [facility] for syncopal w/u per Dr. Thapa. Labs unremarkable except for probable DM, CKD, hypercalcemia    Labs done on October 26, 2017 (viewed on pt's daughter's phone).  To be faxed.  Glucose 135, GFR 51        Ready For Surgery From Anesthesia Perspective.

## 2017-12-04 NOTE — DISCHARGE INSTRUCTIONS
PRE-ADMIT TESTING -  [phone]    [address]  [place] [facility]          Your surgery has been scheduled at [facility]. We are pleased to have the opportunity to serve you. For Further Information please call [phone].    On the day of surgery please report to the Information Desk on the 1st floor.    · CONTACT YOUR PHYSICIAN'S OFFICE THE DAY PRIOR TO YOUR SURGERY TO OBTAIN YOUR ARRIVAL TIME.     · The evening before surgery do not eat anything after 9 p.m. ( this includes hard candy, chewing gum and mints).  You may only have GATORADE, POWERADE AND WATER  from 9 p.m. until you leave your home.   DO NOT DRINK ANY LIQUIDS ON THE WAY TO THE HOSPITAL.      SPECIAL MEDICATION INSTRUCTIONS: TAKE medications checked off by the Anesthesiologist on your Medication List.    Angiogram Patients: Take medications as instructed by your physician, including aspirin.     Surgery Patients:    If you take ASPIRIN - Your PHYSICIAN/SURGEON will need to inform you IF/OR when you need to stop taking aspirin prior to your surgery.     Do Not take any medications containing IBUPROFEN.  Do Not Wear any make-up or dark nail polish   (especially eye make-up) to surgery. If you come to surgery with makeup on you will be required to remove the makeup or nail polish.    Do not shave your surgical area at least 5 days prior to your surgery. The surgical prep will be performed at the hospital according to Infection Control regulations.    Leave all valuables at home.   Do Not wear any jewelry or watches, including any metal in body piercings.  Contact Lens must be removed before surgery. Either do not wear the contact lens or bring a case and solution for storage.  Please bring a container for eyeglasses or dentures as required.  Bring any paperwork your physician has provided, such as consent forms,  history and physicals, doctor's orders, etc.   Bring comfortable clothes that are loose fitting to wear upon  discharge. Take into consideration the type of surgery being performed.  Maintain your diet as advised per your physician the day prior to surgery.      Adequate rest the night before surgery is advised.   Park in the Parking lot behind the hospital or in the [facility] across the street from the parking lot. Parking is complimentary.  If you will be discharged the same day as your procedure, please arrange for a responsible adult to drive you home or to accompany you if traveling by taxi.   YOU WILL NOT BE PERMITTED TO DRIVE OR TO LEAVE THE HOSPITAL ALONE AFTER SURGERY.   It is strongly recommended that you arrange for someone to remain with you for the first 24 hrs following your surgery.       Thank you for your cooperation.  The Staff of [facility].        Bathing Instructions                                                                 Please shower the evening before and morning of your procedure with    ANTIBACTERIAL SOAP. ( DIAL, etc )  Concentrate on the surgical area   for at least 3 minutes and rinse completely. Dry off as usual.   Do not use any deodorant, powder, body lotions, perfume, after shave or    cologne.

## 2017-12-07 ENCOUNTER — ANESTHESIA (OUTPATIENT)
Dept: SURGERY | Facility: OTHER | Age: 62
End: 2017-12-07
Payer: MEDICARE

## 2017-12-07 ENCOUNTER — HOSPITAL ENCOUNTER (OUTPATIENT)
Facility: OTHER | Age: 62
Discharge: HOME | End: 2017-12-07
Attending: PLASTIC SURGERY | Admitting: PLASTIC SURGERY
Payer: MEDICARE

## 2017-12-07 VITALS
RESPIRATION RATE: 18 BRPM | HEIGHT: 63 IN | TEMPERATURE: 98 F | WEIGHT: 140 LBS | DIASTOLIC BLOOD PRESSURE: 73 MMHG | OXYGEN SATURATION: 95 % | SYSTOLIC BLOOD PRESSURE: 128 MMHG | HEART RATE: 62 BPM | BODY MASS INDEX: 24.8 KG/M2

## 2017-12-07 DIAGNOSIS — S01.80XA OPEN WOUND OF FACE, INITIAL ENCOUNTER: Primary | ICD-10-CM

## 2017-12-07 DIAGNOSIS — S02.609A BILATERAL CLOSED FRACTURE OF MANDIBLE, INITIAL ENCOUNTER: ICD-10-CM

## 2017-12-07 PROCEDURE — 36000706: Performed by: PLASTIC SURGERY

## 2017-12-07 PROCEDURE — 36000707: Performed by: PLASTIC SURGERY

## 2017-12-07 PROCEDURE — 00190 ANES PX FACIAL B1/SKULL NOS: CPT | Performed by: PLASTIC SURGERY

## 2017-12-07 PROCEDURE — 37000009 HC ANESTHESIA EA ADD 15 MINS: Performed by: PLASTIC SURGERY

## 2017-12-07 PROCEDURE — 37000008 HC ANESTHESIA 1ST 15 MINUTES: Performed by: PLASTIC SURGERY

## 2017-12-07 PROCEDURE — 71000033 HC RECOVERY, INTIAL HOUR: Performed by: PLASTIC SURGERY

## 2017-12-07 PROCEDURE — 25000003 PHARM REV CODE 250: Performed by: PLASTIC SURGERY

## 2017-12-07 PROCEDURE — 25000003 PHARM REV CODE 250: Performed by: NURSE ANESTHETIST, CERTIFIED REGISTERED

## 2017-12-07 PROCEDURE — 25000003 PHARM REV CODE 250: Performed by: ANESTHESIOLOGY

## 2017-12-07 PROCEDURE — 71000015 HC POSTOP RECOV 1ST HR: Performed by: PLASTIC SURGERY

## 2017-12-07 PROCEDURE — 63600175 PHARM REV CODE 636 W HCPCS: Performed by: PLASTIC SURGERY

## 2017-12-07 PROCEDURE — 63600175 PHARM REV CODE 636 W HCPCS: Performed by: NURSE ANESTHETIST, CERTIFIED REGISTERED

## 2017-12-07 RX ORDER — LIDOCAINE HCL/PF 100 MG/5ML
SYRINGE (ML) INTRAVENOUS
Status: DISCONTINUED | OUTPATIENT
Start: 2017-12-07 | End: 2017-12-07

## 2017-12-07 RX ORDER — ONDANSETRON 2 MG/ML
4 INJECTION INTRAMUSCULAR; INTRAVENOUS DAILY PRN
Status: DISCONTINUED | OUTPATIENT
Start: 2017-12-07 | End: 2017-12-07 | Stop reason: HOSPADM

## 2017-12-07 RX ORDER — PROPOFOL 10 MG/ML
VIAL (ML) INTRAVENOUS
Status: DISCONTINUED | OUTPATIENT
Start: 2017-12-07 | End: 2017-12-07

## 2017-12-07 RX ORDER — HYDROMORPHONE HYDROCHLORIDE 2 MG/ML
0.4 INJECTION, SOLUTION INTRAMUSCULAR; INTRAVENOUS; SUBCUTANEOUS EVERY 5 MIN PRN
Status: DISCONTINUED | OUTPATIENT
Start: 2017-12-07 | End: 2017-12-07 | Stop reason: HOSPADM

## 2017-12-07 RX ORDER — DIPHENHYDRAMINE HYDROCHLORIDE 50 MG/ML
25 INJECTION INTRAMUSCULAR; INTRAVENOUS EVERY 6 HOURS PRN
Status: DISCONTINUED | OUTPATIENT
Start: 2017-12-07 | End: 2017-12-07 | Stop reason: HOSPADM

## 2017-12-07 RX ORDER — FENTANYL CITRATE 50 UG/ML
25 INJECTION, SOLUTION INTRAMUSCULAR; INTRAVENOUS EVERY 5 MIN PRN
Status: DISCONTINUED | OUTPATIENT
Start: 2017-12-07 | End: 2017-12-07 | Stop reason: HOSPADM

## 2017-12-07 RX ORDER — HYDROCODONE BITARTRATE AND ACETAMINOPHEN 5; 325 MG/1; MG/1
1 TABLET ORAL EVERY 4 HOURS PRN
Status: DISCONTINUED | OUTPATIENT
Start: 2017-12-07 | End: 2017-12-07 | Stop reason: HOSPADM

## 2017-12-07 RX ORDER — ONDANSETRON 8 MG/1
8 TABLET, ORALLY DISINTEGRATING ORAL EVERY 8 HOURS PRN
Status: DISCONTINUED | OUTPATIENT
Start: 2017-12-07 | End: 2017-12-07 | Stop reason: HOSPADM

## 2017-12-07 RX ORDER — FENTANYL CITRATE 50 UG/ML
INJECTION, SOLUTION INTRAMUSCULAR; INTRAVENOUS
Status: DISCONTINUED | OUTPATIENT
Start: 2017-12-07 | End: 2017-12-07

## 2017-12-07 RX ORDER — SODIUM CHLORIDE, SODIUM LACTATE, POTASSIUM CHLORIDE, CALCIUM CHLORIDE 600; 310; 30; 20 MG/100ML; MG/100ML; MG/100ML; MG/100ML
INJECTION, SOLUTION INTRAVENOUS CONTINUOUS
Status: DISCONTINUED | OUTPATIENT
Start: 2017-12-07 | End: 2017-12-07 | Stop reason: HOSPADM

## 2017-12-07 RX ORDER — ACETAMINOPHEN 325 MG/1
650 TABLET ORAL EVERY 4 HOURS PRN
Status: DISCONTINUED | OUTPATIENT
Start: 2017-12-07 | End: 2017-12-07 | Stop reason: HOSPADM

## 2017-12-07 RX ORDER — SODIUM CHLORIDE 0.9 % (FLUSH) 0.9 %
3 SYRINGE (ML) INJECTION
Status: DISCONTINUED | OUTPATIENT
Start: 2017-12-07 | End: 2017-12-07 | Stop reason: HOSPADM

## 2017-12-07 RX ORDER — MEPERIDINE HYDROCHLORIDE 50 MG/ML
12.5 INJECTION INTRAMUSCULAR; INTRAVENOUS; SUBCUTANEOUS ONCE AS NEEDED
Status: DISCONTINUED | OUTPATIENT
Start: 2017-12-07 | End: 2017-12-07 | Stop reason: HOSPADM

## 2017-12-07 RX ORDER — FAMOTIDINE 20 MG/1
20 TABLET, FILM COATED ORAL
Status: COMPLETED | OUTPATIENT
Start: 2017-12-07 | End: 2017-12-07

## 2017-12-07 RX ORDER — CEFAZOLIN SODIUM 2 G/50ML
2 SOLUTION INTRAVENOUS
Status: COMPLETED | OUTPATIENT
Start: 2017-12-07 | End: 2017-12-07

## 2017-12-07 RX ORDER — OXYCODONE HYDROCHLORIDE 5 MG/1
5 TABLET ORAL
Status: DISCONTINUED | OUTPATIENT
Start: 2017-12-07 | End: 2017-12-07 | Stop reason: HOSPADM

## 2017-12-07 RX ORDER — LIDOCAINE HYDROCHLORIDE AND EPINEPHRINE 10; 10 MG/ML; UG/ML
INJECTION, SOLUTION INFILTRATION; PERINEURAL
Status: DISCONTINUED | OUTPATIENT
Start: 2017-12-07 | End: 2017-12-07 | Stop reason: HOSPADM

## 2017-12-07 RX ORDER — BACITRACIN 50000 [IU]/1
INJECTION, POWDER, FOR SOLUTION INTRAMUSCULAR
Status: DISCONTINUED | OUTPATIENT
Start: 2017-12-07 | End: 2017-12-07 | Stop reason: HOSPADM

## 2017-12-07 RX ORDER — MIDAZOLAM HYDROCHLORIDE 5 MG/ML
INJECTION INTRAMUSCULAR; INTRAVENOUS
Status: DISCONTINUED | OUTPATIENT
Start: 2017-12-07 | End: 2017-12-07

## 2017-12-07 RX ORDER — DEXTROSE MONOHYDRATE AND SODIUM CHLORIDE 5; .45 G/100ML; G/100ML
INJECTION, SOLUTION INTRAVENOUS CONTINUOUS
Status: DISCONTINUED | OUTPATIENT
Start: 2017-12-07 | End: 2017-12-07 | Stop reason: HOSPADM

## 2017-12-07 RX ADMIN — PROPOFOL 150 MG: 10 INJECTION, EMULSION INTRAVENOUS at 12:12

## 2017-12-07 RX ADMIN — FAMOTIDINE 20 MG: 20 TABLET, FILM COATED ORAL at 10:12

## 2017-12-07 RX ADMIN — CEFAZOLIN SODIUM 2 G: 2 SOLUTION INTRAVENOUS at 12:12

## 2017-12-07 RX ADMIN — FENTANYL CITRATE 100 MCG: 50 INJECTION, SOLUTION INTRAMUSCULAR; INTRAVENOUS at 12:12

## 2017-12-07 RX ADMIN — SODIUM CHLORIDE, SODIUM LACTATE, POTASSIUM CHLORIDE, AND CALCIUM CHLORIDE: 600; 310; 30; 20 INJECTION, SOLUTION INTRAVENOUS at 11:12

## 2017-12-07 RX ADMIN — CARBOXYMETHYLCELLULOSE SODIUM 2 DROP: 2.5 SOLUTION/ DROPS OPHTHALMIC at 12:12

## 2017-12-07 RX ADMIN — MIDAZOLAM 1 MG: 5 INJECTION INTRAMUSCULAR; INTRAVENOUS at 12:12

## 2017-12-07 RX ADMIN — LIDOCAINE HYDROCHLORIDE 75 MG: 20 INJECTION, SOLUTION INTRAVENOUS at 12:12

## 2017-12-07 NOTE — ANESTHESIA POSTPROCEDURE EVALUATION
"Anesthesia Post Evaluation    Patient: Mariah Hamilton    Procedure(s) Performed: Procedure(s) (LRB):  REMOVAL HARDWARE OF MANDIBLE (N/A)    Final Anesthesia Type: general  Patient location during evaluation: PACU  Patient participation: Yes- Able to Participate  Level of consciousness: oriented and awake  Post-procedure vital signs: reviewed and stable  Pain management: adequate  Airway patency: patent  PONV status at discharge: No PONV  Anesthetic complications: no      Cardiovascular status: hemodynamically stable  Respiratory status: unassisted, spontaneous ventilation and room air  Hydration status: euvolemic  Follow-up not needed.        Visit Vitals  /73 (BP Location: Right arm, Patient Position: Lying)   Pulse 62   Temp 36.6 °C (97.8 °F) (Oral)   Resp 18   Ht 5' 3" (1.6 m)   Wt 63.5 kg (140 lb)   SpO2 95%   Breastfeeding? No   BMI 24.80 kg/m²       Pain/Bijan Score: Pain Assessment Performed: Yes (12/7/2017  2:00 PM)  Presence of Pain: denies (12/7/2017  2:00 PM)  Bijan Score: 10 (12/7/2017  2:00 PM)      "

## 2017-12-07 NOTE — PLAN OF CARE
Mariah MCCAIN Alfonso has met all discharge criteria from Phase II. Vital Signs are stable, ambulating  without difficulty. Discharge instructions given, patient verbalized understanding. Discharged from facility via wheelchair in stable condition.

## 2017-12-07 NOTE — TRANSFER OF CARE
"Anesthesia Transfer of Care Note    Patient: Mariah Hamilton    Procedure(s) Performed: Procedure(s) (LRB):  REMOVAL HARDWARE OF MANDIBLE (N/A)    Patient location: PACU    Anesthesia Type: general    Transport from OR: Transported from OR on room air with adequate spontaneous ventilation    Post pain: adequate analgesia    Post assessment: no apparent anesthetic complications and tolerated procedure well    Post vital signs: stable    Level of consciousness: awake, alert and oriented    Nausea/Vomiting: no nausea/vomiting    Complications: none    Transfer of care protocol was followed      Last vitals:   Visit Vitals  /75 (BP Location: Left arm, Patient Position: Lying)   Pulse 80   Temp 36.6 °C (97.9 °F) (Oral)   Ht 5' 3" (1.6 m)   Wt 63.5 kg (140 lb)   SpO2 98%   Breastfeeding? No   BMI 24.80 kg/m²     "

## 2017-12-07 NOTE — DISCHARGE INSTRUCTIONS

## 2017-12-15 NOTE — OP NOTE
DATE OF PROCEDURE:  12/07/2017    PREOPERATIVE DIAGNOSIS:  Subcondylar mandibular fracture.    POSTOPERATIVE DIAGNOSIS:  Subcondylar mandibular fracture.    PROCEDURES:    1.  Removal of hardware, superficial on the maxilla.  2.  Removal of hardware, superficial, mandible.    SURGEON:  Luis M Thapa M.D.    ASSISTANT:  None.    ANESTHESIA:  General through oral intubations.    ESTIMATED BLOOD LOSS:  Minimal.    COMPLICATION:  None.    DISPOSITION:  The patient tolerated procedure well, emerged from general   anesthesia, was extubated in the Operating Room without complication.    DESCRIPTION OF OPERATIVE PROCEDURE:  The patient was taken to the Operating   Room, placed supine on the operating table.  Following induction of general   anesthesia and successful oral intubation, the patient was prepped and draped in   a sterile fashion.  Attention was then directed to the upper and lower jaw   where a total of 10 mL of lidocaine and 1% epinephrine 1:100,000 was   infiltrated.  The hybrid MMF screws were then removed 6 on the maxilla and 6 on   the mandible without complication.  There was good intercuspation of the   dentition.  The patient tolerated procedure well, emerged from general   anesthesia, was extubated in the Operating Room without complication.      HS/IN  dd: 12/14/2017 11:55:00 (CST)  td: 12/14/2017 18:43:18 (CST)  Doc ID   #[id number]  Job ID #[id number]    CC:

## (undated) DEVICE — GLOVE BIOGEL SKINSENSE PI 7.0

## (undated) DEVICE — POSITIONER IV ARMBOARD FOAM

## (undated) DEVICE — SEE MEDLINE ITEM 152622

## (undated) DEVICE — SEE MEDLINE ITEM 153151

## (undated) DEVICE — SYR 10CC LUER LOCK

## (undated) DEVICE — UNDERGLOVE BIOGEL PI SZ 6.5 LF

## (undated) DEVICE — NDL N SERIES MICRO-DISSECTION

## (undated) DEVICE — SKIN MARKER DEVON 160

## (undated) DEVICE — SEE MEDLINE ITEM 157166

## (undated) DEVICE — DRAPE HEAD/BAR 40 X 27 W/ADH

## (undated) DEVICE — GLOVE BIOGEL SKINSENSE PI 7.5

## (undated) DEVICE — UNDERGLOVES BIOGEL PI SZ 7 LF

## (undated) DEVICE — SEE MEDLINE ITEM 156930

## (undated) DEVICE — GLOVE BIOGEL SKINSENSE PI 8.0

## (undated) DEVICE — CATH IV CATHLON W/HUB16GAX

## (undated) DEVICE — POSITIONER HEAD DONUT 9IN FOAM

## (undated) DEVICE — NDL HYPO REG 25G X 1 1/2

## (undated) DEVICE — GAUZE SPONGE 4X4 12PLY

## (undated) DEVICE — TRAY DRY SKIN SCRUB PREP

## (undated) DEVICE — GLOVE BIOGEL SKINSENSE PI 6.5

## (undated) DEVICE — ELECTRODE REM PLYHSV RETURN 9

## (undated) DEVICE — SPONGE LAP 18X18 PREWASHED

## (undated) DEVICE — CLOSURE SKIN STERI STRIP 1/2X4

## (undated) DEVICE — SEE MEDLINE ITEM 157110

## (undated) DEVICE — STAPLER SKIN ROTATING HEAD

## (undated) DEVICE — SYR ONLY LUER LOCK 20CC

## (undated) DEVICE — POSITIONER HEEL FOAM CONVOLTD